# Patient Record
Sex: FEMALE | Race: WHITE | Employment: UNEMPLOYED | URBAN - METROPOLITAN AREA
[De-identification: names, ages, dates, MRNs, and addresses within clinical notes are randomized per-mention and may not be internally consistent; named-entity substitution may affect disease eponyms.]

---

## 2021-06-05 ENCOUNTER — HOSPITAL ENCOUNTER (EMERGENCY)
Age: 51
Discharge: HOME | End: 2021-06-05
Attending: FAMILY MEDICINE
Payer: COMMERCIAL

## 2021-06-05 ENCOUNTER — APPOINTMENT (OUTPATIENT)
Dept: GENERAL RADIOLOGY | Age: 51
End: 2021-06-05
Payer: COMMERCIAL

## 2021-06-05 VITALS
WEIGHT: 135 LBS | SYSTOLIC BLOOD PRESSURE: 111 MMHG | OXYGEN SATURATION: 97 % | HEIGHT: 65 IN | BODY MASS INDEX: 22.49 KG/M2 | RESPIRATION RATE: 18 BRPM | HEART RATE: 80 BPM | TEMPERATURE: 97.1 F | DIASTOLIC BLOOD PRESSURE: 80 MMHG

## 2021-06-05 DIAGNOSIS — S93.601A SPRAIN OF RIGHT FOOT, INITIAL ENCOUNTER: ICD-10-CM

## 2021-06-05 DIAGNOSIS — S63.502A SPRAIN OF LEFT WRIST, INITIAL ENCOUNTER: Primary | ICD-10-CM

## 2021-06-05 DIAGNOSIS — S80.02XA CONTUSION OF LEFT KNEE, INITIAL ENCOUNTER: ICD-10-CM

## 2021-06-05 PROCEDURE — 96372 THER/PROPH/DIAG INJ SC/IM: CPT

## 2021-06-05 PROCEDURE — 6360000002 HC RX W HCPCS: Performed by: FAMILY MEDICINE

## 2021-06-05 PROCEDURE — 73560 X-RAY EXAM OF KNEE 1 OR 2: CPT

## 2021-06-05 PROCEDURE — 73110 X-RAY EXAM OF WRIST: CPT

## 2021-06-05 PROCEDURE — 99283 EMERGENCY DEPT VISIT LOW MDM: CPT

## 2021-06-05 PROCEDURE — 73630 X-RAY EXAM OF FOOT: CPT

## 2021-06-05 RX ORDER — LEVOTHYROXINE SODIUM 0.15 MG/1
150 TABLET ORAL DAILY
COMMUNITY

## 2021-06-05 RX ORDER — KETOROLAC TROMETHAMINE 30 MG/ML
60 INJECTION, SOLUTION INTRAMUSCULAR; INTRAVENOUS ONCE
Status: COMPLETED | OUTPATIENT
Start: 2021-06-05 | End: 2021-06-05

## 2021-06-05 RX ORDER — OXYBUTYNIN CHLORIDE 5 MG/1
5 TABLET ORAL 2 TIMES DAILY
COMMUNITY

## 2021-06-05 RX ORDER — TRAZODONE HYDROCHLORIDE 50 MG/1
50 TABLET ORAL NIGHTLY PRN
COMMUNITY

## 2021-06-05 RX ORDER — NAPROXEN 375 MG/1
375 TABLET ORAL 2 TIMES DAILY WITH MEALS
COMMUNITY
End: 2021-11-04

## 2021-06-05 RX ORDER — IBUPROFEN 800 MG/1
800 TABLET ORAL EVERY 6 HOURS PRN
Qty: 20 TABLET | Refills: 0 | Status: SHIPPED | OUTPATIENT
Start: 2021-06-05

## 2021-06-05 RX ADMIN — KETOROLAC TROMETHAMINE 60 MG: 30 INJECTION, SOLUTION INTRAMUSCULAR at 11:09

## 2021-06-05 ASSESSMENT — PAIN DESCRIPTION - ORIENTATION: ORIENTATION: RIGHT;LEFT

## 2021-06-05 ASSESSMENT — PAIN SCALES - GENERAL
PAINLEVEL_OUTOF10: 9
PAINLEVEL_OUTOF10: 9

## 2021-06-05 ASSESSMENT — PAIN DESCRIPTION - ONSET: ONSET: SUDDEN

## 2021-06-05 ASSESSMENT — PAIN DESCRIPTION - DESCRIPTORS: DESCRIPTORS: BURNING

## 2021-06-05 ASSESSMENT — PAIN DESCRIPTION - PAIN TYPE: TYPE: ACUTE PAIN

## 2021-06-05 ASSESSMENT — PAIN DESCRIPTION - FREQUENCY: FREQUENCY: CONTINUOUS

## 2021-06-05 ASSESSMENT — PAIN DESCRIPTION - PROGRESSION: CLINICAL_PROGRESSION: NOT CHANGED

## 2021-06-05 NOTE — ED PROVIDER NOTES
---------------------------   The nursing notes within the ED encounter and vital signs as below have been reviewed. /80   Pulse 80   Temp 97.1 °F (36.2 °C) (Temporal)   Resp 18   Ht 5' 5\" (1.651 m)   Wt 135 lb (61.2 kg)   SpO2 97%   BMI 22.47 kg/m²   Oxygen Saturation Interpretation: Normal      ---------------------------------------------------PHYSICAL EXAM--------------------------------------    Constitutional/General: Alert and oriented x3, well appearing, non toxic in NAD  Head: NC/AT  Eyes: PERRL, EOMI  Mouth: Oropharynx clear, handling secretions, no trismus  Neck: Supple, full ROM, no meningeal signs  Pulmonary: Lungs clear to auscultation bilaterally, no wheezes, rales, or rhonchi. Not in respiratory distress  Cardiovascular:  Regular rate and rhythm, no murmurs, gallops, or rubs. 2+ distal pulses  Abdomen: Soft, non tender, non distended,   Extremities: Moves all extremities x 4. Warm and well perfused  There is left wrist tenderness to palpation on the radial aspect, with a mild swelling and ecchymosis noted. There is no deformity. The left knee is swollen over the patella and there is severe tenderness palpation over the patella. Is also erythematous and warm. Right foot:  There is tenderness palpation over the MTP joints at the fourth and fifth joints. No deformity or swelling was noted. No ecchymosis. Skin: warm and dry without rash  Neurologic: GCS 15,  Psych: Normal Affect      ------------------------------ ED COURSE/MEDICAL DECISION MAKING----------------------  Medications   ketorolac (TORADOL) injection 60 mg (60 mg Intramuscular Given 6/5/21 1109)         Medical Decision Making:    Simple    Counseling: The emergency provider has spoken with the patient and discussed todays results, in addition to providing specific details for the plan of care and counseling regarding the diagnosis and prognosis.   Questions are answered at this time and they are agreeable with the

## 2021-11-04 ENCOUNTER — HOSPITAL ENCOUNTER (EMERGENCY)
Age: 51
Discharge: HOME | End: 2021-11-04
Attending: EMERGENCY MEDICINE
Payer: COMMERCIAL

## 2021-11-04 VITALS
OXYGEN SATURATION: 99 % | DIASTOLIC BLOOD PRESSURE: 81 MMHG | HEART RATE: 72 BPM | BODY MASS INDEX: 26.46 KG/M2 | TEMPERATURE: 97.1 F | WEIGHT: 159 LBS | SYSTOLIC BLOOD PRESSURE: 121 MMHG | RESPIRATION RATE: 20 BRPM

## 2021-11-04 DIAGNOSIS — S91.302D OPEN WOUND OF LEFT HEEL, SUBSEQUENT ENCOUNTER: Primary | ICD-10-CM

## 2021-11-04 PROCEDURE — 99283 EMERGENCY DEPT VISIT LOW MDM: CPT

## 2021-11-04 PROCEDURE — 96372 THER/PROPH/DIAG INJ SC/IM: CPT

## 2021-11-04 PROCEDURE — 6360000002 HC RX W HCPCS: Performed by: EMERGENCY MEDICINE

## 2021-11-04 RX ORDER — DOXYCYCLINE HYCLATE 100 MG
100 TABLET ORAL 2 TIMES DAILY
COMMUNITY

## 2021-11-04 RX ORDER — KETOROLAC TROMETHAMINE 30 MG/ML
30 INJECTION, SOLUTION INTRAMUSCULAR; INTRAVENOUS ONCE
Status: COMPLETED | OUTPATIENT
Start: 2021-11-04 | End: 2021-11-04

## 2021-11-04 RX ADMIN — KETOROLAC TROMETHAMINE 30 MG: 30 INJECTION, SOLUTION INTRAMUSCULAR at 12:56

## 2021-11-04 ASSESSMENT — PAIN DESCRIPTION - DESCRIPTORS: DESCRIPTORS: BURNING

## 2021-11-04 ASSESSMENT — PAIN SCALES - GENERAL
PAINLEVEL_OUTOF10: 10
PAINLEVEL_OUTOF10: 10

## 2021-11-04 ASSESSMENT — PAIN DESCRIPTION - PROGRESSION: CLINICAL_PROGRESSION: NOT CHANGED

## 2021-11-04 ASSESSMENT — PAIN DESCRIPTION - PAIN TYPE: TYPE: ACUTE PAIN

## 2021-11-04 ASSESSMENT — PAIN DESCRIPTION - ONSET: ONSET: ON-GOING

## 2021-11-04 ASSESSMENT — PAIN - FUNCTIONAL ASSESSMENT: PAIN_FUNCTIONAL_ASSESSMENT: 0-10

## 2021-11-04 ASSESSMENT — PAIN DESCRIPTION - ORIENTATION: ORIENTATION: LEFT

## 2021-11-04 ASSESSMENT — PAIN DESCRIPTION - FREQUENCY: FREQUENCY: CONTINUOUS

## 2021-11-04 ASSESSMENT — PAIN DESCRIPTION - LOCATION: LOCATION: FOOT
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
## 2023-02-24 LAB — URINE CULTURE: ABNORMAL

## 2023-03-24 ENCOUNTER — TELEPHONE (OUTPATIENT)
Dept: PRIMARY CARE | Facility: CLINIC | Age: 53
End: 2023-03-24
Payer: COMMERCIAL

## 2023-03-27 ENCOUNTER — TELEMEDICINE (OUTPATIENT)
Dept: PRIMARY CARE | Facility: CLINIC | Age: 53
End: 2023-03-27
Payer: COMMERCIAL

## 2023-03-27 DIAGNOSIS — E03.9 ACQUIRED HYPOTHYROIDISM: ICD-10-CM

## 2023-03-27 DIAGNOSIS — M54.42 CHRONIC MIDLINE LOW BACK PAIN WITH BILATERAL SCIATICA: Primary | ICD-10-CM

## 2023-03-27 DIAGNOSIS — M54.41 CHRONIC MIDLINE LOW BACK PAIN WITH BILATERAL SCIATICA: Primary | ICD-10-CM

## 2023-03-27 DIAGNOSIS — G89.29 CHRONIC MIDLINE LOW BACK PAIN WITH BILATERAL SCIATICA: Primary | ICD-10-CM

## 2023-03-27 PROBLEM — M54.31 SCIATICA OF RIGHT SIDE: Status: ACTIVE | Noted: 2023-03-27

## 2023-03-27 PROBLEM — M96.1 LUMBAR POST-LAMINECTOMY SYNDROME: Status: ACTIVE | Noted: 2023-03-27

## 2023-03-27 PROBLEM — M54.2 CERVICAL PAIN (NECK): Status: ACTIVE | Noted: 2023-03-27

## 2023-03-27 PROBLEM — R53.83 FATIGUE: Status: ACTIVE | Noted: 2023-03-27

## 2023-03-27 PROBLEM — R53.1 GENERALIZED WEAKNESS: Status: ACTIVE | Noted: 2023-03-27

## 2023-03-27 PROBLEM — M54.17 LUMBOSACRAL NEURITIS: Status: ACTIVE | Noted: 2023-03-27

## 2023-03-27 PROBLEM — M47.817 SPONDYLOSIS OF LUMBOSACRAL SPINE WITHOUT MYELOPATHY: Status: ACTIVE | Noted: 2023-03-27

## 2023-03-27 PROBLEM — M96.1 FAILED BACK SURGICAL SYNDROME: Status: ACTIVE | Noted: 2023-03-27

## 2023-03-27 PROBLEM — M25.562 CHRONIC PAIN OF LEFT KNEE: Status: ACTIVE | Noted: 2023-03-27

## 2023-03-27 PROBLEM — F32.A DEPRESSION: Status: ACTIVE | Noted: 2023-03-27

## 2023-03-27 PROBLEM — M25.551 PAIN OF BOTH HIP JOINTS: Status: ACTIVE | Noted: 2023-03-27

## 2023-03-27 PROBLEM — R52 GENERALIZED BODY ACHES: Status: ACTIVE | Noted: 2023-03-27

## 2023-03-27 PROBLEM — R63.5 ABNORMAL WEIGHT GAIN: Status: ACTIVE | Noted: 2023-03-27

## 2023-03-27 PROBLEM — K59.00 CONSTIPATION: Status: ACTIVE | Noted: 2023-03-27

## 2023-03-27 PROBLEM — G90.09 OTHER IDIOPATHIC PERIPHERAL AUTONOMIC NEUROPATHY: Status: ACTIVE | Noted: 2023-03-27

## 2023-03-27 PROBLEM — F51.04 CHRONIC INSOMNIA: Status: ACTIVE | Noted: 2023-03-27

## 2023-03-27 PROBLEM — F41.9 ANXIETY: Status: ACTIVE | Noted: 2023-03-27

## 2023-03-27 PROBLEM — M54.6 PAIN IN THORACIC SPINE: Status: ACTIVE | Noted: 2023-03-27

## 2023-03-27 PROBLEM — M25.552 PAIN OF BOTH HIP JOINTS: Status: ACTIVE | Noted: 2023-03-27

## 2023-03-27 PROBLEM — E78.2 COMBINED HYPERLIPIDEMIA: Status: ACTIVE | Noted: 2023-03-27

## 2023-03-27 PROBLEM — R39.15 URINARY URGENCY: Status: ACTIVE | Noted: 2023-03-27

## 2023-03-27 PROBLEM — M54.50 CHRONIC LOW BACK PAIN: Status: ACTIVE | Noted: 2023-03-27

## 2023-03-27 PROBLEM — R31.9 HEMATURIA: Status: ACTIVE | Noted: 2023-03-27

## 2023-03-27 PROBLEM — M79.2 RADICULAR PAIN IN RIGHT ARM: Status: ACTIVE | Noted: 2023-03-27

## 2023-03-27 PROBLEM — G43.009 MIGRAINE WITHOUT AURA AND WITHOUT STATUS MIGRAINOSUS, NOT INTRACTABLE: Status: ACTIVE | Noted: 2023-03-27

## 2023-03-27 PROBLEM — E66.9 OBESITY: Status: ACTIVE | Noted: 2023-03-27

## 2023-03-27 PROCEDURE — 99213 OFFICE O/P EST LOW 20 MIN: CPT | Performed by: FAMILY MEDICINE

## 2023-03-27 RX ORDER — ATORVASTATIN CALCIUM 40 MG/1
1 TABLET, FILM COATED ORAL NIGHTLY
COMMUNITY
Start: 2016-08-24 | End: 2023-09-27 | Stop reason: SDUPTHER

## 2023-03-27 RX ORDER — ALBUTEROL SULFATE 90 UG/1
AEROSOL, METERED RESPIRATORY (INHALATION)
COMMUNITY
Start: 2022-10-17 | End: 2023-09-15 | Stop reason: ALTCHOICE

## 2023-03-27 RX ORDER — LEVOTHYROXINE SODIUM 112 UG/1
112 TABLET ORAL
COMMUNITY
End: 2023-03-27 | Stop reason: SDUPTHER

## 2023-03-27 RX ORDER — CYCLOBENZAPRINE HCL 10 MG
1 TABLET ORAL 3 TIMES DAILY
COMMUNITY
Start: 2021-08-13 | End: 2023-09-27 | Stop reason: SDUPTHER

## 2023-03-27 RX ORDER — LEVOTHYROXINE SODIUM 112 UG/1
112 TABLET ORAL
Qty: 90 TABLET | Refills: 0 | Status: SHIPPED | OUTPATIENT
Start: 2023-03-27 | End: 2023-07-17 | Stop reason: SDUPTHER

## 2023-03-27 RX ORDER — KETOROLAC TROMETHAMINE 10 MG/1
TABLET, FILM COATED ORAL
COMMUNITY
Start: 2023-02-15 | End: 2023-03-27 | Stop reason: SDUPTHER

## 2023-03-27 RX ORDER — KETOROLAC TROMETHAMINE 10 MG/1
10 TABLET, FILM COATED ORAL 3 TIMES DAILY
Qty: 20 TABLET | Refills: 0 | Status: SHIPPED | OUTPATIENT
Start: 2023-03-27 | End: 2023-04-26 | Stop reason: SDUPTHER

## 2023-03-27 NOTE — PROGRESS NOTES
Subjective   Patient ID: Catherine Cano is a 52 y.o. female who presents for No chief complaint on file..    HPI   Chronic low back dull pain was worse and radiated down to BLE. pt controlled BM and bladder well. no imbalance or falls.     Review of Systems    Objective   There were no vitals taken for this visit.    Physical Exam    Assessment/Plan     Worse Chronic low back pain with pain radiating down to BLE, toradol 10mg tid prn as dir. caution of SE from toradol such as kidney damage and stomach bleeding. take toradol  with foods. Fu with ortho

## 2023-04-26 ENCOUNTER — OFFICE VISIT (OUTPATIENT)
Dept: PRIMARY CARE | Facility: CLINIC | Age: 53
End: 2023-04-26
Payer: COMMERCIAL

## 2023-04-26 ENCOUNTER — TELEPHONE (OUTPATIENT)
Dept: PRIMARY CARE | Facility: CLINIC | Age: 53
End: 2023-04-26

## 2023-04-26 VITALS
DIASTOLIC BLOOD PRESSURE: 75 MMHG | WEIGHT: 173 LBS | BODY MASS INDEX: 28.82 KG/M2 | SYSTOLIC BLOOD PRESSURE: 134 MMHG | HEIGHT: 65 IN

## 2023-04-26 DIAGNOSIS — G89.29 CHRONIC BILATERAL LOW BACK PAIN WITHOUT SCIATICA: Primary | ICD-10-CM

## 2023-04-26 DIAGNOSIS — M54.50 CHRONIC BILATERAL LOW BACK PAIN WITHOUT SCIATICA: Primary | ICD-10-CM

## 2023-04-26 DIAGNOSIS — F17.210 SMOKES 1 TO 10 CIGARETTES PER DAY: ICD-10-CM

## 2023-04-26 PROCEDURE — 96372 THER/PROPH/DIAG INJ SC/IM: CPT | Performed by: FAMILY MEDICINE

## 2023-04-26 PROCEDURE — 20553 NJX 1/MLT TRIGGER POINTS 3/>: CPT | Performed by: FAMILY MEDICINE

## 2023-04-26 PROCEDURE — 99213 OFFICE O/P EST LOW 20 MIN: CPT | Performed by: FAMILY MEDICINE

## 2023-04-26 PROCEDURE — 99406 BEHAV CHNG SMOKING 3-10 MIN: CPT | Performed by: FAMILY MEDICINE

## 2023-04-26 RX ORDER — KETOROLAC TROMETHAMINE 30 MG/ML
30 INJECTION, SOLUTION INTRAMUSCULAR; INTRAVENOUS ONCE
Status: COMPLETED | OUTPATIENT
Start: 2023-04-26 | End: 2023-04-26

## 2023-04-26 RX ORDER — KETOROLAC TROMETHAMINE 30 MG/ML
30 INJECTION, SOLUTION INTRAMUSCULAR; INTRAVENOUS ONCE
Status: DISCONTINUED | OUTPATIENT
Start: 2023-04-26 | End: 2023-04-26

## 2023-04-26 RX ORDER — KETOROLAC TROMETHAMINE 10 MG/1
10 TABLET, FILM COATED ORAL 3 TIMES DAILY
Qty: 20 TABLET | Refills: 0 | Status: SHIPPED | OUTPATIENT
Start: 2023-04-26 | End: 2023-05-09 | Stop reason: SDUPTHER

## 2023-04-26 RX ADMIN — KETOROLAC TROMETHAMINE 30 MG: 30 INJECTION, SOLUTION INTRAMUSCULAR; INTRAVENOUS at 16:03

## 2023-04-26 ASSESSMENT — PATIENT HEALTH QUESTIONNAIRE - PHQ9
1. LITTLE INTEREST OR PLEASURE IN DOING THINGS: NOT AT ALL
SUM OF ALL RESPONSES TO PHQ9 QUESTIONS 1 AND 2: 0
2. FEELING DOWN, DEPRESSED OR HOPELESS: NOT AT ALL

## 2023-04-26 ASSESSMENT — ENCOUNTER SYMPTOMS: BACK PAIN: 1

## 2023-04-26 NOTE — PROGRESS NOTES
"Subjective   Patient ID: Catherine Cano is a 52 y.o. female who presents for Back Pain.    Back Pain       Patient noticed worse localized muscle spasm at low back got in the past week. turning trunk from side to side made the pain worse. Pt fell backwards last week. Stable BM and bladder well. no genital area or low ext  numbness. Good balance    Review of Systems   Musculoskeletal:  Positive for back pain.       Objective   Ht 1.651 m (5' 5\")   Wt 78.5 kg (173 lb)   BMI 28.79 kg/m²     Physical Exam  Musculoskeletal:        Arms:        Mild  distress, well groomed, eyes: Normal pupil size,  neck is supple, lungs: CTA b/l, no rales, heart: RRR,  abd: soft, no tenderness, BS+, moderate localized  tenderness at paraspinal muscles as marked in the picture, no local swelling or erythema. turning trunk from side to side aggravated the pain. normal strength and sensation at ext. normal gait and station. Good mood.     Assessment/Plan     Myofascial pain at  low  back, worse. Pt requested toradol shot and trigger point injection of the areas of pain. Under sterile condition, the areas of pain as marked in the picture was each  injected with 1.5cc  1% lidocaine (NDC: 0082-2731-65) in a pepper spray fashion.  total volume of lidocaine used was 9 ml. pt felt pain relief after the injection. Recommend warm compress. Advise stretch exercise for pain relief. Toradol tab po prn as dir. Caution of SE of stomach bleeding and kidney damage   Nicotine dependence counseling: patient  smokes cigarettes.  I discussed with patient that  tobacco addiction increases the risks of worse pain, COPD (emphysema), heart attack, stroke, Peripheral artery disease, and cancer etc. Treatment options such as behavioral counseling (specialty clinic, smoking cessation program, 1-800-QUIT-NOW) and medications (Zyban, chantix and Nicotine replacement therapy) were  discussed with the patient who is considering to quit. Nicotine withdrawal symptoms " such as  increased appetite and weight gain, changes in mood (dysphoria or depression), insomnia, irritability, anxiety, difficulty concentrating and restlessness were discussed with patient. Inform patient that Nicotine withdrawal symptoms peak in the first three days of quitting and subside over three to four weeks. More than 3 minutes' discussion and counseling.

## 2023-05-09 ENCOUNTER — TELEPHONE (OUTPATIENT)
Dept: PRIMARY CARE | Facility: CLINIC | Age: 53
End: 2023-05-09
Payer: COMMERCIAL

## 2023-05-09 DIAGNOSIS — G89.29 CHRONIC BILATERAL LOW BACK PAIN WITHOUT SCIATICA: ICD-10-CM

## 2023-05-09 DIAGNOSIS — M54.50 CHRONIC BILATERAL LOW BACK PAIN WITHOUT SCIATICA: ICD-10-CM

## 2023-05-09 RX ORDER — KETOROLAC TROMETHAMINE 10 MG/1
10 TABLET, FILM COATED ORAL 3 TIMES DAILY
Qty: 15 TABLET | Refills: 0 | Status: SHIPPED
Start: 2023-05-09 | End: 2023-05-26 | Stop reason: SDUPTHER

## 2023-05-09 NOTE — TELEPHONE ENCOUNTER
Pt called in after hours - is requesting a prescription for topamax to help with weight loss and she is wanting some sort of pain medication to take with her on vacation that they leave for on Friday.

## 2023-05-26 DIAGNOSIS — M54.50 CHRONIC BILATERAL LOW BACK PAIN WITHOUT SCIATICA: ICD-10-CM

## 2023-05-26 DIAGNOSIS — G89.29 CHRONIC BILATERAL LOW BACK PAIN WITHOUT SCIATICA: ICD-10-CM

## 2023-05-26 RX ORDER — KETOROLAC TROMETHAMINE 10 MG/1
10 TABLET, FILM COATED ORAL 3 TIMES DAILY
Qty: 15 TABLET | Refills: 0 | Status: SHIPPED | OUTPATIENT
Start: 2023-05-26 | End: 2023-06-14 | Stop reason: SDUPTHER

## 2023-06-14 ENCOUNTER — OFFICE VISIT (OUTPATIENT)
Dept: PRIMARY CARE | Facility: CLINIC | Age: 53
End: 2023-06-14
Payer: COMMERCIAL

## 2023-06-14 VITALS — HEART RATE: 82 BPM | DIASTOLIC BLOOD PRESSURE: 79 MMHG | SYSTOLIC BLOOD PRESSURE: 136 MMHG

## 2023-06-14 DIAGNOSIS — M54.50 CHRONIC BILATERAL LOW BACK PAIN WITHOUT SCIATICA: ICD-10-CM

## 2023-06-14 DIAGNOSIS — M25.551 PAIN OF BOTH HIP JOINTS: Primary | ICD-10-CM

## 2023-06-14 DIAGNOSIS — F17.210 SMOKES 1 TO 10 CIGARETTES PER DAY: ICD-10-CM

## 2023-06-14 DIAGNOSIS — M25.552 PAIN OF BOTH HIP JOINTS: Primary | ICD-10-CM

## 2023-06-14 DIAGNOSIS — M54.31 SCIATICA OF RIGHT SIDE: ICD-10-CM

## 2023-06-14 DIAGNOSIS — G89.29 CHRONIC BILATERAL LOW BACK PAIN WITHOUT SCIATICA: ICD-10-CM

## 2023-06-14 PROCEDURE — 99406 BEHAV CHNG SMOKING 3-10 MIN: CPT | Performed by: FAMILY MEDICINE

## 2023-06-14 PROCEDURE — 96372 THER/PROPH/DIAG INJ SC/IM: CPT | Performed by: FAMILY MEDICINE

## 2023-06-14 PROCEDURE — 99213 OFFICE O/P EST LOW 20 MIN: CPT | Performed by: FAMILY MEDICINE

## 2023-06-14 RX ORDER — KETOROLAC TROMETHAMINE 10 MG/1
10 TABLET, FILM COATED ORAL 3 TIMES DAILY
Qty: 15 TABLET | Refills: 0 | Status: SHIPPED
Start: 2023-06-14 | End: 2023-08-02 | Stop reason: SDUPTHER

## 2023-06-14 RX ORDER — GABAPENTIN 300 MG/1
300 CAPSULE ORAL NIGHTLY
Qty: 30 CAPSULE | Refills: 0 | Status: SHIPPED
Start: 2023-06-14 | End: 2023-09-15 | Stop reason: ALTCHOICE

## 2023-06-14 RX ORDER — KETOROLAC TROMETHAMINE 30 MG/ML
30 INJECTION, SOLUTION INTRAMUSCULAR; INTRAVENOUS ONCE
Status: COMPLETED | OUTPATIENT
Start: 2023-06-14 | End: 2023-06-14

## 2023-06-14 RX ADMIN — KETOROLAC TROMETHAMINE 30 MG: 30 INJECTION, SOLUTION INTRAMUSCULAR; INTRAVENOUS at 15:06

## 2023-06-14 NOTE — PROGRESS NOTES
Subjective   Patient ID: Catherine Cano is a 52 y.o. female who presents for worse low back and b/l hip pain    HPI   Chronic low back and b/l hip dull pain was worse at 7-9/10. the back pain radiated down to RLE. pt denies LE weakness or numbness. pt controlled BM and bladder well. pt stated that walking or bending  over increased the pain. no imbalance or falls.     Review of Systems    Objective   /79   Pulse 82     Physical Exam  Mild  distress, well groomed, No sclera icterus. normal respiration, lungs: CTA b/l, heart: RRR, no LE  edema, normal pedal pulses, abd: soft, no tenderness, BS+, mild low back and b/l hip tenderness, DTR were normal at the knees, SLR test was + at rt side, normal strength and sensation at low extremities, good balance, No depressed mood.    Assessment/Plan     Chronic low back and b/l hip pain which got worse with pain radiating down to RLE, toradol 30mg IM and start neurontin  as dir. Pain specialist eval. Check xr. call office if symptoms do not improve in 2 weeks or if pain gets worse.  Nicotine dependence counseling: patient  smokes cigarettes.  I discussed with patient that  tobacco addiction increases the risks of COPD (emphysema), heart attack, stroke, Peripheral artery disease, and cancer etc. Treatment options such as behavioral counseling (specialty clinic, smoking cessation program, 1-800-QUIT-NOW) and medications (Zyban, chantix and Nicotine replacement therapy) were  discussed with the patient who is considering to quit. Nicotine withdrawal symptoms such as  increased appetite and weight gain, changes in mood (dysphoria or depression), insomnia, irritability, anxiety, difficulty concentrating and restlessness were discussed with patient. Inform patient that Nicotine withdrawal symptoms peak in the first three days of quitting and subside over three to four weeks. More than 3 minutes' discussion and counseling.

## 2023-06-20 ENCOUNTER — TELEPHONE (OUTPATIENT)
Dept: PRIMARY CARE | Facility: CLINIC | Age: 53
End: 2023-06-20
Payer: COMMERCIAL

## 2023-06-20 NOTE — TELEPHONE ENCOUNTER
Patient was seen in [facility] where the did x-rays and CT scans for her. They found a compression fracture in her spine and she is having surgery Thursday morning. She just wanted to give you an update. Thank you

## 2023-07-05 DIAGNOSIS — M25.552 PAIN OF BOTH HIP JOINTS: ICD-10-CM

## 2023-07-05 DIAGNOSIS — M54.31 SCIATICA OF RIGHT SIDE: ICD-10-CM

## 2023-07-05 DIAGNOSIS — M25.551 PAIN OF BOTH HIP JOINTS: ICD-10-CM

## 2023-07-05 RX ORDER — GABAPENTIN 300 MG/1
CAPSULE ORAL
Qty: 30 CAPSULE | Refills: 0 | OUTPATIENT
Start: 2023-07-05

## 2023-07-14 ENCOUNTER — TELEPHONE (OUTPATIENT)
Dept: PRIMARY CARE | Facility: CLINIC | Age: 53
End: 2023-07-14
Payer: COMMERCIAL

## 2023-07-14 NOTE — TELEPHONE ENCOUNTER
Patient is getting ready to go to the [facility] and would like a refill on her Toradol sent to [facility] in [place]. She would also like to go back down to her previous dose on the levothyroxine as she is having side affects from it. Her number is [phone]

## 2023-07-17 ENCOUNTER — TELEPHONE (OUTPATIENT)
Dept: PRIMARY CARE | Facility: CLINIC | Age: 53
End: 2023-07-17

## 2023-07-17 ENCOUNTER — TELEMEDICINE (OUTPATIENT)
Dept: PRIMARY CARE | Facility: CLINIC | Age: 53
End: 2023-07-17
Payer: COMMERCIAL

## 2023-07-17 DIAGNOSIS — M54.50 CHRONIC BILATERAL LOW BACK PAIN WITHOUT SCIATICA: Primary | ICD-10-CM

## 2023-07-17 DIAGNOSIS — E03.9 ACQUIRED HYPOTHYROIDISM: ICD-10-CM

## 2023-07-17 DIAGNOSIS — G89.29 CHRONIC BILATERAL LOW BACK PAIN WITHOUT SCIATICA: Primary | ICD-10-CM

## 2023-07-17 PROCEDURE — 99213 OFFICE O/P EST LOW 20 MIN: CPT | Performed by: FAMILY MEDICINE

## 2023-07-17 RX ORDER — KETOROLAC TROMETHAMINE 10 MG/1
10 TABLET, FILM COATED ORAL EVERY 6 HOURS PRN
Qty: 15 TABLET | Refills: 0 | Status: SHIPPED | OUTPATIENT
Start: 2023-07-17 | End: 2023-07-22

## 2023-07-17 RX ORDER — LEVOTHYROXINE SODIUM 112 UG/1
112 TABLET ORAL
Qty: 90 TABLET | Refills: 0 | Status: SHIPPED | OUTPATIENT
Start: 2023-07-17 | End: 2023-09-27 | Stop reason: SDUPTHER

## 2023-07-17 NOTE — PROGRESS NOTES
Subjective   Patient ID: Catherine Cano is a 52 y.o. female who presents for worse low back pain  HPI   Low back pain was worse at 9/10 a few days ago. the pain was localized to low back. pt denies LE weakness or numbness. pt controlled BM and bladder well. pt stated that walking or bending  over increased the pain. Review of Systems    Objective   There were no vitals taken for this visit.    Physical Exam    Assessment/Plan     Chronic low back pain which got worse. cold compress, stretch exercise.  start toradol tab as dir. caution of SE from toradol such as kidney damage and stomach bleeding. take toradol tab  with foods. call office if symptoms do not improve in 2 weeks or if pain gets worse.

## 2023-07-17 NOTE — TELEPHONE ENCOUNTER
Patient called back regarding her levothyroxine and her bottle that she filled on July 5th shows 125 instead of the 112 that you called in on 3/27. Can we adjust this back to the 112 for her? Thank you

## 2023-08-02 ENCOUNTER — TELEMEDICINE (OUTPATIENT)
Dept: PRIMARY CARE | Facility: CLINIC | Age: 53
End: 2023-08-02
Payer: COMMERCIAL

## 2023-08-02 ENCOUNTER — TELEPHONE (OUTPATIENT)
Dept: PRIMARY CARE | Facility: CLINIC | Age: 53
End: 2023-08-02

## 2023-08-02 DIAGNOSIS — G89.29 CHRONIC MIDLINE THORACIC BACK PAIN: Primary | ICD-10-CM

## 2023-08-02 DIAGNOSIS — M54.6 CHRONIC MIDLINE THORACIC BACK PAIN: Primary | ICD-10-CM

## 2023-08-02 DIAGNOSIS — M54.50 CHRONIC BILATERAL LOW BACK PAIN WITHOUT SCIATICA: ICD-10-CM

## 2023-08-02 DIAGNOSIS — G89.29 CHRONIC BILATERAL LOW BACK PAIN WITHOUT SCIATICA: ICD-10-CM

## 2023-08-02 PROCEDURE — 99213 OFFICE O/P EST LOW 20 MIN: CPT | Performed by: FAMILY MEDICINE

## 2023-08-02 RX ORDER — KETOROLAC TROMETHAMINE 10 MG/1
10 TABLET, FILM COATED ORAL 3 TIMES DAILY
Qty: 15 TABLET | Refills: 0 | Status: SHIPPED | OUTPATIENT
Start: 2023-08-02 | End: 2023-08-14 | Stop reason: SDUPTHER

## 2023-08-02 NOTE — TELEPHONE ENCOUNTER
Patient is in the [facility] until tomorrow and would like to know if we can call in some torodol to [facility] there at [phone]. She is in so much pain she is struggling to move. She also feels like she is getting a bladder infection and has been taking Azo. Can we call her in the Torodol.

## 2023-08-02 NOTE — PROGRESS NOTES
Subjective   Patient ID: Catherine Cano is a 52 y.o. female who presents for mid  back pain  HPI   Chronic mid  back dull pain was worse at 9/10. the pain  radiated down to RLE. pt denies LE weakness or numbness. pt controlled BM and bladder well. pt stated that walking or bending  over increased the pain. no falls.     Review of Systems    Objective   There were no vitals taken for this visit.    Physical Exam    Assessment/Plan     Worse back pain.  start toradol tab  as dir. caution of SE from toradol tab such as kidney damage and stomach bleeding. take toradol with foods. call office if symptoms do not improve in 2 weeks or if pain gets worse.

## 2023-08-14 ENCOUNTER — OFFICE VISIT (OUTPATIENT)
Dept: PRIMARY CARE | Facility: CLINIC | Age: 53
End: 2023-08-14
Payer: COMMERCIAL

## 2023-08-14 ENCOUNTER — TELEPHONE (OUTPATIENT)
Dept: PRIMARY CARE | Facility: CLINIC | Age: 53
End: 2023-08-14

## 2023-08-14 VITALS — DIASTOLIC BLOOD PRESSURE: 79 MMHG | SYSTOLIC BLOOD PRESSURE: 129 MMHG

## 2023-08-14 DIAGNOSIS — M54.50 CHRONIC BILATERAL LOW BACK PAIN WITHOUT SCIATICA: Primary | ICD-10-CM

## 2023-08-14 DIAGNOSIS — F17.210 SMOKES 1 TO 10 CIGARETTES PER DAY: ICD-10-CM

## 2023-08-14 DIAGNOSIS — B00.1 HERPES LABIALIS: ICD-10-CM

## 2023-08-14 DIAGNOSIS — G89.29 CHRONIC BILATERAL LOW BACK PAIN WITHOUT SCIATICA: Primary | ICD-10-CM

## 2023-08-14 PROCEDURE — 96372 THER/PROPH/DIAG INJ SC/IM: CPT | Performed by: FAMILY MEDICINE

## 2023-08-14 PROCEDURE — 99406 BEHAV CHNG SMOKING 3-10 MIN: CPT | Performed by: FAMILY MEDICINE

## 2023-08-14 PROCEDURE — 99214 OFFICE O/P EST MOD 30 MIN: CPT | Performed by: FAMILY MEDICINE

## 2023-08-14 RX ORDER — KETOROLAC TROMETHAMINE 30 MG/ML
30 INJECTION, SOLUTION INTRAMUSCULAR; INTRAVENOUS ONCE
Status: COMPLETED | OUTPATIENT
Start: 2023-08-14 | End: 2023-08-14

## 2023-08-14 RX ORDER — NORTRIPTYLINE HYDROCHLORIDE 75 MG/1
CAPSULE ORAL
COMMUNITY
Start: 2021-08-31 | End: 2023-08-14 | Stop reason: SDUPTHER

## 2023-08-14 RX ORDER — KETOROLAC TROMETHAMINE 10 MG/1
10 TABLET, FILM COATED ORAL 3 TIMES DAILY
Qty: 15 TABLET | Refills: 0 | Status: SHIPPED | OUTPATIENT
Start: 2023-08-14 | End: 2023-09-01 | Stop reason: SDUPTHER

## 2023-08-14 RX ORDER — NORTRIPTYLINE HYDROCHLORIDE 75 MG/1
CAPSULE ORAL
Qty: 90 CAPSULE | Refills: 0 | Status: SHIPPED
Start: 2023-08-14 | End: 2023-11-01 | Stop reason: ALTCHOICE

## 2023-08-14 RX ORDER — VALACYCLOVIR HYDROCHLORIDE 1 G/1
2000 TABLET, FILM COATED ORAL 2 TIMES DAILY
Qty: 4 TABLET | Refills: 0 | Status: SHIPPED | OUTPATIENT
Start: 2023-08-14 | End: 2023-08-15

## 2023-08-14 RX ADMIN — KETOROLAC TROMETHAMINE 30 MG: 30 INJECTION, SOLUTION INTRAMUSCULAR; INTRAVENOUS at 15:34

## 2023-08-14 NOTE — ADDENDUM NOTE
Addended by: DEACON MILLER on: 8/14/2023 04:04 PM     Modules accepted: Orders, Level of Service

## 2023-08-14 NOTE — PROGRESS NOTES
Subjective   Patient ID: Catherine Cano is a 52 y.o. female who presents for worse low back pain    HPI   Chronic low back dull pain was worse at 7/10. the pain was localized to low back. r pt denies LE weakness or numbness. pt controlled BM and bladder well. pt stated that walking or bending  over increased the pain. no imbalance or falls.   Cold sores at lips for a few days. No fever or chills.   Review of Systems    Objective   /79     Physical Exam  No distress, well groomed, No sclera icterus. Cold sores on upper and lower lips. No cervical or axillary lymphadenopathy.  normal respiration, lungs: CTA b/l, heart: RRR, no LE  edema, normal pedal pulses, abd: soft, no tenderness, BS+, mild low back tenderness, DTR were normal at the knees, SLR test was negative, normal strength and sensation at low extremities, good balance, No depressed mood.    Assessment/Plan     Chronic low back pain which got worse lately. Toradol 30mg IM and toradol tab as dir and fu with pain specialist.  Cont nortriptyline and Neurontin.   Nicotine dependence counseling: patient  smokes cigarettes.  I discussed with patient that  tobacco addiction increases the risks of COPD (emphysema), heart attack, stroke, Peripheral artery disease, and cancer etc. Treatment options such as behavioral counseling (specialty clinic, smoking cessation program, 1-800-QUIT-NOW) and medications (Zyban, chantix and Nicotine replacement therapy) were  discussed with the patient who is considering to quit. Nicotine withdrawal symptoms such as  increased appetite and weight gain, changes in mood (dysphoria or depression), insomnia, irritability, anxiety, difficulty concentrating and restlessness were discussed with patient. Inform patient that Nicotine withdrawal symptoms peak in the first three days of quitting and subside over three to four weeks. More than 3 minutes' discussion and counseling.

## 2023-08-14 NOTE — TELEPHONE ENCOUNTER
Catherine was wanting to know if while you were calling in her medications if you could also call in something for cold sores as she has been getting them her whole vacation and woke with another one this morning.

## 2023-09-01 ENCOUNTER — TELEPHONE (OUTPATIENT)
Dept: PRIMARY CARE | Facility: CLINIC | Age: 53
End: 2023-09-01
Payer: COMMERCIAL

## 2023-09-01 ENCOUNTER — TELEPHONE (OUTPATIENT)
Dept: PRIMARY CARE | Facility: CLINIC | Age: 53
End: 2023-09-01

## 2023-09-01 NOTE — TELEPHONE ENCOUNTER
She called and wanted me to let you know that she is on the schedule 9/15 and was just here on 8/14/23

## 2023-09-15 ENCOUNTER — OFFICE VISIT (OUTPATIENT)
Dept: PRIMARY CARE | Facility: CLINIC | Age: 53
End: 2023-09-15
Payer: COMMERCIAL

## 2023-09-15 VITALS
SYSTOLIC BLOOD PRESSURE: 126 MMHG | DIASTOLIC BLOOD PRESSURE: 76 MMHG | RESPIRATION RATE: 14 BRPM | HEIGHT: 65 IN | WEIGHT: 175 LBS | HEART RATE: 76 BPM | BODY MASS INDEX: 29.16 KG/M2

## 2023-09-15 DIAGNOSIS — Z12.31 BREAST CANCER SCREENING BY MAMMOGRAM: ICD-10-CM

## 2023-09-15 DIAGNOSIS — Z00.00 ROUTINE MEDICAL EXAM: Primary | ICD-10-CM

## 2023-09-15 PROBLEM — E66.9 OBESITY: Status: RESOLVED | Noted: 2023-03-27 | Resolved: 2023-09-15

## 2023-09-15 PROBLEM — K59.00 CONSTIPATION: Status: RESOLVED | Noted: 2023-03-27 | Resolved: 2023-09-15

## 2023-09-15 PROBLEM — R31.9 HEMATURIA: Status: RESOLVED | Noted: 2023-03-27 | Resolved: 2023-09-15

## 2023-09-15 PROCEDURE — 99396 PREV VISIT EST AGE 40-64: CPT | Performed by: FAMILY MEDICINE

## 2023-09-15 RX ORDER — VENLAFAXINE HYDROCHLORIDE 150 MG/1
CAPSULE, EXTENDED RELEASE ORAL
COMMUNITY
Start: 2020-10-28 | End: 2023-09-27 | Stop reason: SDUPTHER

## 2023-09-15 NOTE — PROGRESS NOTES
pt has regular dental visits. no vision problems. no hearing loss.   Lifestyle: healthy diet. + weight concerns. Pt exercises occ.   + tobacco but no  alcohol abuse.   Safety elements used: seat belt, safe driving habits and smoke detector.   no passive smoke exposure, chemical abuse, domestic violence, anxiety symptoms, depression symptoms, pt has safe sexual behavior and safe driving habits, no driving violations, no history of DUI. no tuberculosis exposure.   The patient is postmenopausal. No vaginal bleeding. no menopausal symptoms. she is sexually active. no bladder concerns.  no history of an abnormal pap smear.      Review of Systems  Constitutional: no chills, no fever and no night sweats.   Eyes: no blurred vision and no eyesight problems.   ENT: no hearing loss, no nasal congestion, no nasal discharge, no hoarseness and no sore throat.   Neck: no mass(es) and no swelling.   Cardiovascular: no chest pain, no intermittent leg claudication, no lower extremity edema, no palpitations and no syncope.   Respiratory: no cough, no shortness of breath during exertion, no shortness of breath at rest and no wheezing.   Gastrointestinal: no abdominal pain, no blood in stools, no constipation, no diarrhea, no melena, no nausea, no rectal pain and no vomiting.   Genitourinary: no unexplained vaginal bleeding, no dysuria, no change in urinary frequency, no genital lesions, no hematuria, no urinary hesitancy, no incontinence, no pelvic pain, no sexual dysfunction, no feelings of urinary urgency and no vaginal discharge.   Musculoskeletal: + arthralgias, + back pain, + localized joint pain, no myalgias,  + neck pain.   Integumentary: no new skin lesions, no rashes and no skin wound.   Neurological: no confusion, no convulsions, no difficulty walking, no dizziness, no headache, no limb weakness, no memory changes, no numbness, no speech difficulties and no tingling.   Psychiatric: no anxiety, no personality change, no  depression, no anhedonia, no sleep disturbances and no substance use disorders.   Endocrine: no changes in appetite, no deepening of the voice, no polyuria, no feelings of weakness  Hematologic/Lymphatic: no tendency for easy bleeding, no tendency for easy bruising, no recurrent infections and no swollen glands.     Physical Exam  Constitutional: Alert and in no acute distress. Well developed, well nourished.   Head and Face: Head and face: Normal.  Palpation of the face and sinuses: Normal.    Eyes: Normal external exam. Pupils: PERRLA,  EOMI.    Ears, Nose, Mouth, and Throat: External inspection of ears and nose: Normal.  Hearing: Normal.  Nasal mucosa, septum, and turbinates: Normal.  Oropharynx: Normal.    Neck: No neck mass was observed. Supple. Thyroid not enlarged and there were no palpable thyroid nodules.   Cardiovascular: Heart rate and rhythm were normal, normal S1 and S2, no gallops, no murmurs and no pericardial rub. Pedal pulses: Normal. No peripheral edema.   Pulmonary: No respiratory distress. Clear bilateral breath sounds.   Chest wall: Normal.    Abdomen: Soft nontender; no abdominal mass palpated. No organomegaly. No hernias.   Musculoskeletal: Gait and station: Normal. No joint swelling seen, normal movements of all extremities. Range of motion: Normal.  Muscle strength/tone: Normal.  tenderness at neck, left shoulder, low back, b/l hips and knees  Skin: Normal skin color and pigmentation, normal skin turgor, and no rash. Palpation of skin and subcutaneous tissue: Normal.    Neurologic: Cranial nerves 2-12 grossly intact. Deep tendon reflexes were 2+ and symmetric. Sensation: Normal. Coordination: Normal.    Psychiatric: Judgment and insight: Intact. Alert and oriented x 3. Recent and remote memory: Normal.  Mood and affect: Normal.   Lymphatic: No cervical lymphadenopathy. Palpation of lymph nodes in axillae: Normal.      Unremarkable PE except overweight. recommend nutritionist eval. Recommend  DASH diet and regular exercise. check CBC, CMP, lipid, TSH.  Advise eye exam by an OD yearly and dental exam every 6 months. will monitor lipid and weight yearly. Recommend sunscreen application if exposed to the sun when UV index is above 2.  recommend Hep C, HepB, HIV test. recommend   shingle,  hepB, flu, covid shot.    We will call pt regarding lab results. f/u as scheduled. DC smoking cigarettes. Recommend mammogram

## 2023-09-26 ENCOUNTER — TELEPHONE (OUTPATIENT)
Dept: PRIMARY CARE | Facility: CLINIC | Age: 53
End: 2023-09-26
Payer: COMMERCIAL

## 2023-09-27 ENCOUNTER — OFFICE VISIT (OUTPATIENT)
Dept: PRIMARY CARE | Facility: CLINIC | Age: 53
End: 2023-09-27
Payer: COMMERCIAL

## 2023-09-27 ENCOUNTER — TELEPHONE (OUTPATIENT)
Dept: PRIMARY CARE | Facility: CLINIC | Age: 53
End: 2023-09-27

## 2023-09-27 VITALS — HEART RATE: 72 BPM | SYSTOLIC BLOOD PRESSURE: 132 MMHG | RESPIRATION RATE: 14 BRPM | DIASTOLIC BLOOD PRESSURE: 76 MMHG

## 2023-09-27 DIAGNOSIS — F32.A DEPRESSION, UNSPECIFIED DEPRESSION TYPE: ICD-10-CM

## 2023-09-27 DIAGNOSIS — E03.9 ACQUIRED HYPOTHYROIDISM: ICD-10-CM

## 2023-09-27 DIAGNOSIS — F17.210 SMOKES 1 TO 10 CIGARETTES PER DAY: ICD-10-CM

## 2023-09-27 DIAGNOSIS — E78.2 COMBINED HYPERLIPIDEMIA: ICD-10-CM

## 2023-09-27 DIAGNOSIS — G89.29 CHRONIC BILATERAL LOW BACK PAIN WITHOUT SCIATICA: ICD-10-CM

## 2023-09-27 DIAGNOSIS — M25.512 CHRONIC LEFT SHOULDER PAIN: ICD-10-CM

## 2023-09-27 DIAGNOSIS — R39.15 URINARY URGENCY: ICD-10-CM

## 2023-09-27 DIAGNOSIS — G89.29 CHRONIC LEFT SHOULDER PAIN: ICD-10-CM

## 2023-09-27 DIAGNOSIS — M94.0 COSTOCHONDRITIS, ACUTE: Primary | ICD-10-CM

## 2023-09-27 DIAGNOSIS — M54.50 CHRONIC BILATERAL LOW BACK PAIN WITHOUT SCIATICA: ICD-10-CM

## 2023-09-27 PROCEDURE — 96372 THER/PROPH/DIAG INJ SC/IM: CPT | Performed by: FAMILY MEDICINE

## 2023-09-27 PROCEDURE — 99406 BEHAV CHNG SMOKING 3-10 MIN: CPT | Performed by: FAMILY MEDICINE

## 2023-09-27 PROCEDURE — 99214 OFFICE O/P EST MOD 30 MIN: CPT | Performed by: FAMILY MEDICINE

## 2023-09-27 RX ORDER — LEVOTHYROXINE SODIUM 112 UG/1
112 TABLET ORAL
Qty: 90 TABLET | Refills: 3 | Status: SHIPPED | OUTPATIENT
Start: 2023-09-27

## 2023-09-27 RX ORDER — ATORVASTATIN CALCIUM 40 MG/1
40 TABLET, FILM COATED ORAL NIGHTLY
Qty: 90 TABLET | Refills: 3 | Status: SHIPPED | OUTPATIENT
Start: 2023-09-27

## 2023-09-27 RX ORDER — CYCLOBENZAPRINE HCL 10 MG
10 TABLET ORAL DAILY
Qty: 90 TABLET | Refills: 1 | Status: SHIPPED | OUTPATIENT
Start: 2023-09-27

## 2023-09-27 RX ORDER — OXYBUTYNIN CHLORIDE 5 MG/1
5 TABLET ORAL 2 TIMES DAILY
Qty: 180 TABLET | Refills: 3 | Status: SHIPPED | OUTPATIENT
Start: 2023-09-27

## 2023-09-27 RX ORDER — KETOROLAC TROMETHAMINE 30 MG/ML
30 INJECTION, SOLUTION INTRAMUSCULAR; INTRAVENOUS ONCE
Status: COMPLETED | OUTPATIENT
Start: 2023-09-27 | End: 2023-09-27

## 2023-09-27 RX ORDER — KETOROLAC TROMETHAMINE 10 MG/1
10 TABLET, FILM COATED ORAL 3 TIMES DAILY
Qty: 15 TABLET | Refills: 0 | Status: SHIPPED | OUTPATIENT
Start: 2023-09-27 | End: 2024-01-08 | Stop reason: SDUPTHER

## 2023-09-27 RX ORDER — VENLAFAXINE HYDROCHLORIDE 150 MG/1
CAPSULE, EXTENDED RELEASE ORAL
Qty: 90 CAPSULE | Refills: 3 | Status: SHIPPED | OUTPATIENT
Start: 2023-09-27

## 2023-09-27 RX ADMIN — KETOROLAC TROMETHAMINE 30 MG: 30 INJECTION, SOLUTION INTRAMUSCULAR; INTRAVENOUS at 14:28

## 2023-09-27 NOTE — PROGRESS NOTES
Subjective   Patient ID: Catherine Cano is a 53 y.o. female who presents for front chest wall , left shoulder and low back pain    HPI   Chronic low back and left shoulder dull pain was worse at 7-9/10. the pain was localized to low back and  left shoulder with decreased rom.  pt denies ext weakness or numbness. pt controlled BM  well. pt stated that walking or bending  over increased the pain. no  falls. Pt also started to have front chest wall pain. Palpation of chest wall made the pain worse. No cough or sob or heart palpitation      Review of Systems    Objective   /76   Pulse 72   Resp 14     Physical Exam  Mild  distress, well groomed, No sclera icterus. normal respiration, lungs: CTA b/l, heart: RRR, no LE  edema, normal pedal pulses, abd: soft, no tenderness, BS+, mild low back, left shoulder and rt front chest wall tenderness, no local erythema at chest wall. Decreased rom at left shoulder. DTR were normal at the knees, SLR test was negative, normal strength and sensation at extremities, good balance, No depressed mood.        Assessment/Plan     Pain at left shoulder, low back and chest wall. Toradol 30mg IM and toradol tab as dir, caution of SE of GI bleeding and kidney damage from taking toradol tab. Recommend pain specialist eval.   Nicotine dependence counseling: patient  smokes cigarettes.  I discussed with patient that  tobacco addiction increases the risks of COPD (emphysema), heart attack, stroke, Peripheral artery disease, and cancer etc. Treatment options such as behavioral counseling (specialty clinic, smoking cessation program, 1-800-QUIT-NOW) and medications (Zyban, chantix and Nicotine replacement therapy) were  discussed with the patient who is considering to quit. Nicotine withdrawal symptoms such as  increased appetite and weight gain, changes in mood (dysphoria or depression), insomnia, irritability, anxiety, difficulty concentrating and restlessness were discussed with  patient. Inform patient that Nicotine withdrawal symptoms peak in the first three days of quitting and subside over three to four weeks. More than 3 minutes' discussion and counseling.

## 2023-09-29 ENCOUNTER — TELEPHONE (OUTPATIENT)
Dept: PRIMARY CARE | Facility: CLINIC | Age: 53
End: 2023-09-29
Payer: COMMERCIAL

## 2023-09-29 NOTE — TELEPHONE ENCOUNTER
Catherine's meds will not be approved for 90 days supply unless you do a verbal prior auth will you do this or should she just do the 30 day supply. [phone]

## 2023-10-06 ENCOUNTER — LAB (OUTPATIENT)
Dept: LAB | Facility: LAB | Age: 53
End: 2023-10-06
Payer: COMMERCIAL

## 2023-10-06 DIAGNOSIS — Z00.00 ROUTINE MEDICAL EXAM: ICD-10-CM

## 2023-10-06 LAB
ALBUMIN SERPL BCP-MCNC: 4 G/DL (ref 3.4–5)
ALP SERPL-CCNC: 36 U/L (ref 33–110)
ALT SERPL W P-5'-P-CCNC: 16 U/L (ref 7–45)
ANION GAP SERPL CALC-SCNC: 14 MMOL/L (ref 10–20)
AST SERPL W P-5'-P-CCNC: 12 U/L (ref 9–39)
BILIRUB SERPL-MCNC: 0.3 MG/DL (ref 0–1.2)
BUN SERPL-MCNC: 17 MG/DL (ref 6–23)
CALCIUM SERPL-MCNC: 8.8 MG/DL (ref 8.6–10.3)
CHLORIDE SERPL-SCNC: 108 MMOL/L (ref 98–107)
CHOLEST SERPL-MCNC: 230 MG/DL (ref 0–199)
CHOLESTEROL/HDL RATIO: 6.9
CO2 SERPL-SCNC: 21 MMOL/L (ref 21–32)
CREAT SERPL-MCNC: 1.11 MG/DL (ref 0.5–1.05)
ERYTHROCYTE [DISTWIDTH] IN BLOOD BY AUTOMATED COUNT: 14.6 % (ref 11.5–14.5)
GFR SERPL CREATININE-BSD FRML MDRD: 59 ML/MIN/1.73M*2
GLUCOSE SERPL-MCNC: 78 MG/DL (ref 74–99)
HCT VFR BLD AUTO: 41.1 % (ref 36–46)
HDLC SERPL-MCNC: 33.2 MG/DL
HGB BLD-MCNC: 13.6 G/DL (ref 12–16)
LDLC SERPL CALC-MCNC: 126 MG/DL (ref 140–190)
MCH RBC QN AUTO: 33.3 PG (ref 26–34)
MCHC RBC AUTO-ENTMCNC: 33.1 G/DL (ref 32–36)
MCV RBC AUTO: 101 FL (ref 80–100)
NON HDL CHOLESTEROL: 197 MG/DL (ref 0–149)
NRBC BLD-RTO: 0 /100 WBCS (ref 0–0)
PLATELET # BLD AUTO: 293 X10*3/UL (ref 150–450)
PMV BLD AUTO: 10.1 FL (ref 7.5–11.5)
POTASSIUM SERPL-SCNC: 4.5 MMOL/L (ref 3.5–5.3)
PROT SERPL-MCNC: 6.8 G/DL (ref 6.4–8.2)
RBC # BLD AUTO: 4.08 X10*6/UL (ref 4–5.2)
SODIUM SERPL-SCNC: 138 MMOL/L (ref 136–145)
TRIGL SERPL-MCNC: 353 MG/DL (ref 0–149)
TSH SERPL-ACNC: 0.66 MIU/L (ref 0.44–3.98)
VLDL: 71 MG/DL (ref 0–40)
WBC # BLD AUTO: 8.2 X10*3/UL (ref 4.4–11.3)

## 2023-10-06 PROCEDURE — 84443 ASSAY THYROID STIM HORMONE: CPT

## 2023-10-06 PROCEDURE — 36415 COLL VENOUS BLD VENIPUNCTURE: CPT

## 2023-10-06 PROCEDURE — 80061 LIPID PANEL: CPT

## 2023-10-06 PROCEDURE — 85027 COMPLETE CBC AUTOMATED: CPT

## 2023-10-06 PROCEDURE — 80053 COMPREHEN METABOLIC PANEL: CPT

## 2023-10-31 ENCOUNTER — TELEPHONE (OUTPATIENT)
Dept: PRIMARY CARE | Facility: CLINIC | Age: 53
End: 2023-10-31
Payer: COMMERCIAL

## 2023-10-31 NOTE — TELEPHONE ENCOUNTER
Needs a refill on her Toradol 10 mg takes it 2 times a day please send to [facility] in [place] and she is raking leaves legs hurts and has a lot of pain in her left shoulder and she stated that if you need to see her she can do a virtual visit.

## 2023-11-01 ENCOUNTER — TELEMEDICINE (OUTPATIENT)
Dept: PRIMARY CARE | Facility: CLINIC | Age: 53
End: 2023-11-01
Payer: COMMERCIAL

## 2023-11-01 DIAGNOSIS — M25.512 CHRONIC LEFT SHOULDER PAIN: Primary | ICD-10-CM

## 2023-11-01 DIAGNOSIS — Z12.11 COLON CANCER SCREENING: ICD-10-CM

## 2023-11-01 DIAGNOSIS — G89.29 CHRONIC LEFT SHOULDER PAIN: Primary | ICD-10-CM

## 2023-11-01 PROCEDURE — 99213 OFFICE O/P EST LOW 20 MIN: CPT | Performed by: FAMILY MEDICINE

## 2023-11-01 RX ORDER — KETOROLAC TROMETHAMINE 10 MG/1
10 TABLET, FILM COATED ORAL EVERY 8 HOURS PRN
Qty: 15 TABLET | Refills: 0 | Status: SHIPPED | OUTPATIENT
Start: 2023-11-01 | End: 2023-11-06

## 2023-11-01 NOTE — PROGRESS NOTES
Subjective   Patient ID: Catherine Cano is a 53 y.o. female who presents for worse left shoulder pain  HPI   Pt had worse left shoulder pain at 10/10 in the past few days. Raising arm increased the pain. Pt had normal sensation and strength at left arm  Review of Systems    Objective   There were no vitals taken for this visit.    Physical Exam    Assessment/Plan     Worse left shoulder pain.  cold compress,   start toradol tab as dir. caution of SE from toradol such as kidney damage and stomach bleeding. take toradol  with foods. Pain specialist kristie

## 2023-12-11 ENCOUNTER — TELEPHONE (OUTPATIENT)
Dept: PRIMARY CARE | Facility: CLINIC | Age: 53
End: 2023-12-11
Payer: COMMERCIAL

## 2023-12-11 NOTE — TELEPHONE ENCOUNTER
"Pt called and stated that she keeps getting cold sores in her nose. She said you \"gave her a purple pill\" that helped with the other one.    Also, she would like a shot of torodol for her left shoulder and lower back. Please advise.  "

## 2023-12-12 ENCOUNTER — APPOINTMENT (OUTPATIENT)
Dept: PRIMARY CARE | Facility: CLINIC | Age: 53
End: 2023-12-12
Payer: COMMERCIAL

## 2023-12-13 ENCOUNTER — HOSPITAL ENCOUNTER (OUTPATIENT)
Dept: RADIOLOGY | Facility: HOSPITAL | Age: 53
Discharge: HOME | End: 2023-12-13
Payer: COMMERCIAL

## 2023-12-13 ENCOUNTER — OFFICE VISIT (OUTPATIENT)
Dept: PRIMARY CARE | Facility: CLINIC | Age: 53
End: 2023-12-13
Payer: COMMERCIAL

## 2023-12-13 VITALS — SYSTOLIC BLOOD PRESSURE: 132 MMHG | DIASTOLIC BLOOD PRESSURE: 77 MMHG | HEART RATE: 80 BPM

## 2023-12-13 DIAGNOSIS — G89.29 CHRONIC LEFT SHOULDER PAIN: ICD-10-CM

## 2023-12-13 DIAGNOSIS — B00.1 HERPES LABIALIS: ICD-10-CM

## 2023-12-13 DIAGNOSIS — G89.29 CHRONIC LEFT SHOULDER PAIN: Primary | ICD-10-CM

## 2023-12-13 DIAGNOSIS — M25.512 CHRONIC LEFT SHOULDER PAIN: ICD-10-CM

## 2023-12-13 DIAGNOSIS — M25.512 CHRONIC LEFT SHOULDER PAIN: Primary | ICD-10-CM

## 2023-12-13 PROCEDURE — 73030 X-RAY EXAM OF SHOULDER: CPT | Mod: LEFT SIDE | Performed by: STUDENT IN AN ORGANIZED HEALTH CARE EDUCATION/TRAINING PROGRAM

## 2023-12-13 PROCEDURE — 99214 OFFICE O/P EST MOD 30 MIN: CPT | Performed by: FAMILY MEDICINE

## 2023-12-13 PROCEDURE — 73030 X-RAY EXAM OF SHOULDER: CPT | Mod: LT,FY

## 2023-12-13 RX ORDER — KETOROLAC TROMETHAMINE 10 MG/1
10 TABLET, FILM COATED ORAL 4 TIMES DAILY PRN
Qty: 15 TABLET | Refills: 0 | Status: SHIPPED | OUTPATIENT
Start: 2023-12-13 | End: 2023-12-18

## 2023-12-13 RX ORDER — VALACYCLOVIR HYDROCHLORIDE 1 G/1
2000 TABLET, FILM COATED ORAL 2 TIMES DAILY
Qty: 4 TABLET | Refills: 0 | Status: SHIPPED | OUTPATIENT
Start: 2023-12-13 | End: 2023-12-14

## 2023-12-13 NOTE — ASSESSMENT & PLAN NOTE
Chronic left shoulder pain which got worse lately. Toradol tab as dir.  cold compress, stretch exercise.  Check xr. caution of SE from Toradol tab such as kidney damage and stomach bleeding. take Toradol tab with foods. Dc smoking

## 2023-12-13 NOTE — PROGRESS NOTES
Subjective   Patient ID: Catherine Cano is a 53 y.o. female who presents for left shoulder pain and sores at nose    HPI   Chronic left shoulder dull pain was worse at 7-10/10 in the past mos. No recent shoulder injury. pt denies left arm weakness or numbness. Raising left arm  over increased the pain. Pt noticed burning sores at tip of her nose in the past few days. No blurry vision or pain in the eyes    Review of Systems    Objective   /77   Pulse 80     Physical Exam  No distress, well groomed, No sclera icterus. No cervical or axillary lymphadenopathy. normal respiration, lungs: CTA b/l, heart: RRR, no LE  edema, normal pedal pulses, abd: soft, no tenderness, BS+, moderate left shoulder tenderness with decreased rom,  normal strength and sensation at left arm there were blistery sores at tip of nose with oozing and tenderness, good balance, No depressed mood.    Assessment/Plan   Problem List Items Addressed This Visit             ICD-10-CM    Herpes labialis B00.1     Valtrex as dir. Call office if symptoms do not resolve in 7 days           Relevant Medications    valACYclovir (Valtrex) 1 gram tablet    Chronic left shoulder pain - Primary M25.512, G89.29     Chronic left shoulder pain which got worse lately. Toradol tab as dir.  cold compress, stretch exercise.  Check xr. caution of SE from Toradol tab such as kidney damage and stomach bleeding. take Toradol tab with foods. Dc smoking         Relevant Medications    ketorolac (Toradol) 10 mg tablet

## 2023-12-14 ENCOUNTER — TELEPHONE (OUTPATIENT)
Dept: PRIMARY CARE | Facility: CLINIC | Age: 53
End: 2023-12-14

## 2023-12-14 ENCOUNTER — APPOINTMENT (OUTPATIENT)
Dept: PRIMARY CARE | Facility: CLINIC | Age: 53
End: 2023-12-14
Payer: COMMERCIAL

## 2024-01-08 ENCOUNTER — OFFICE VISIT (OUTPATIENT)
Dept: PRIMARY CARE | Facility: CLINIC | Age: 54
End: 2024-01-08
Payer: COMMERCIAL

## 2024-01-08 VITALS — SYSTOLIC BLOOD PRESSURE: 125 MMHG | DIASTOLIC BLOOD PRESSURE: 76 MMHG

## 2024-01-08 DIAGNOSIS — M54.50 CHRONIC BILATERAL LOW BACK PAIN WITHOUT SCIATICA: ICD-10-CM

## 2024-01-08 DIAGNOSIS — M25.512 CHRONIC LEFT SHOULDER PAIN: Primary | ICD-10-CM

## 2024-01-08 DIAGNOSIS — F17.210 SMOKES 1 TO 10 CIGARETTES PER DAY: ICD-10-CM

## 2024-01-08 DIAGNOSIS — G89.29 CHRONIC LEFT SHOULDER PAIN: Primary | ICD-10-CM

## 2024-01-08 DIAGNOSIS — G89.29 CHRONIC BILATERAL LOW BACK PAIN WITHOUT SCIATICA: ICD-10-CM

## 2024-01-08 PROCEDURE — 20610 DRAIN/INJ JOINT/BURSA W/O US: CPT | Performed by: FAMILY MEDICINE

## 2024-01-08 PROCEDURE — 99214 OFFICE O/P EST MOD 30 MIN: CPT | Performed by: FAMILY MEDICINE

## 2024-01-08 RX ORDER — TRIAMCINOLONE ACETONIDE 40 MG/ML
40 INJECTION, SUSPENSION INTRA-ARTICULAR; INTRAMUSCULAR ONCE
Status: COMPLETED | OUTPATIENT
Start: 2024-01-08 | End: 2024-01-08

## 2024-01-08 RX ORDER — KETOROLAC TROMETHAMINE 10 MG/1
10 TABLET, FILM COATED ORAL 3 TIMES DAILY
Qty: 15 TABLET | Refills: 0 | Status: SHIPPED | OUTPATIENT
Start: 2024-01-08 | End: 2024-06-10 | Stop reason: SDUPTHER

## 2024-01-08 RX ADMIN — TRIAMCINOLONE ACETONIDE 40 MG: 40 INJECTION, SUSPENSION INTRA-ARTICULAR; INTRAMUSCULAR at 16:49

## 2024-02-15 ENCOUNTER — TELEPHONE (OUTPATIENT)
Dept: PRIMARY CARE | Facility: CLINIC | Age: 54
End: 2024-02-15
Payer: COMMERCIAL

## 2024-02-15 NOTE — TELEPHONE ENCOUNTER
Pt called in and is having extreme pain in shoulder. Pt requested refill on   ketorolac (Toradol) 10 mg tablet  Or something stronger?

## 2024-02-27 ENCOUNTER — TELEPHONE (OUTPATIENT)
Dept: PRIMARY CARE | Facility: CLINIC | Age: 54
End: 2024-02-27
Payer: COMMERCIAL

## 2024-02-28 ENCOUNTER — TELEMEDICINE (OUTPATIENT)
Dept: PRIMARY CARE | Facility: CLINIC | Age: 54
End: 2024-02-28
Payer: COMMERCIAL

## 2024-02-28 DIAGNOSIS — M54.9 MID BACK PAIN: ICD-10-CM

## 2024-02-28 DIAGNOSIS — M79.662 PAIN AND SWELLING OF LOWER LEG, LEFT: ICD-10-CM

## 2024-02-28 DIAGNOSIS — G89.29 CHRONIC LEFT SHOULDER PAIN: Primary | ICD-10-CM

## 2024-02-28 DIAGNOSIS — M79.89 PAIN AND SWELLING OF LOWER LEG, LEFT: ICD-10-CM

## 2024-02-28 DIAGNOSIS — M25.512 CHRONIC LEFT SHOULDER PAIN: Primary | ICD-10-CM

## 2024-02-28 PROCEDURE — 99214 OFFICE O/P EST MOD 30 MIN: CPT | Performed by: FAMILY MEDICINE

## 2024-02-28 RX ORDER — KETOROLAC TROMETHAMINE 10 MG/1
10 TABLET, FILM COATED ORAL EVERY 8 HOURS PRN
Qty: 15 TABLET | Refills: 0 | Status: SHIPPED | OUTPATIENT
Start: 2024-02-28 | End: 2024-03-04

## 2024-02-28 NOTE — ASSESSMENT & PLAN NOTE
toradol tab as dir. caution of SE from toradol such as kidney damage and stomach bleeding. take toradol with foods.

## 2024-02-28 NOTE — PROGRESS NOTES
Subjective   Patient ID: Catherine Cano is a 53 y.o. female who presents for left shoulder and mid back pain got worse 10 days ago.     HPI   left shoulder and mid back pain got worse 10 days ago. LLE swelling 3 days. No sob, cp. Raising left arm and bending over made the pain worse left shoulder and mid back respectively.   Review of Systems    Objective   There were no vitals taken for this visit.    Physical Exam    Assessment/Plan   Problem List Items Addressed This Visit             ICD-10-CM    Mid back pain M54.9    Relevant Medications    ketorolac (Toradol) 10 mg tablet    Other Relevant Orders    Referral to Pain Medicine    Chronic left shoulder pain - Primary M25.512, G89.29     toradol tab as dir. caution of SE from toradol such as kidney damage and stomach bleeding. take toradol with foods.            Relevant Medications    ketorolac (Toradol) 10 mg tablet    Other Relevant Orders    Referral to Orthopaedic Surgery    Pain and swelling of lower leg, left M79.662, M79.89     Concerning DVT. Recommend to go to er for eval asap

## 2024-04-02 RX ORDER — TRAZODONE HYDROCHLORIDE 50 MG/1
50 TABLET ORAL NIGHTLY PRN
Qty: 90 TABLET | Refills: 3 | OUTPATIENT
Start: 2024-04-02

## 2024-04-05 ENCOUNTER — TELEMEDICINE (OUTPATIENT)
Dept: PRIMARY CARE | Facility: CLINIC | Age: 54
End: 2024-04-05
Payer: COMMERCIAL

## 2024-04-05 ENCOUNTER — TELEPHONE (OUTPATIENT)
Dept: PRIMARY CARE | Facility: CLINIC | Age: 54
End: 2024-04-05
Payer: COMMERCIAL

## 2024-04-05 DIAGNOSIS — F51.04 CHRONIC INSOMNIA: Primary | ICD-10-CM

## 2024-04-05 DIAGNOSIS — G89.29 CHRONIC LEFT SHOULDER PAIN: ICD-10-CM

## 2024-04-05 DIAGNOSIS — M25.512 CHRONIC LEFT SHOULDER PAIN: ICD-10-CM

## 2024-04-05 DIAGNOSIS — R39.15 URINARY URGENCY: ICD-10-CM

## 2024-04-05 DIAGNOSIS — M54.31 SCIATICA OF RIGHT SIDE: ICD-10-CM

## 2024-04-05 PROCEDURE — 99214 OFFICE O/P EST MOD 30 MIN: CPT | Performed by: FAMILY MEDICINE

## 2024-04-05 RX ORDER — TRAZODONE HYDROCHLORIDE 50 MG/1
50 TABLET ORAL NIGHTLY
Qty: 30 TABLET | Refills: 0 | Status: SHIPPED | OUTPATIENT
Start: 2024-04-05

## 2024-04-05 RX ORDER — KETOROLAC TROMETHAMINE 10 MG/1
10 TABLET, FILM COATED ORAL EVERY 8 HOURS PRN
Qty: 15 TABLET | Refills: 0 | Status: SHIPPED | OUTPATIENT
Start: 2024-04-05

## 2024-04-05 RX ORDER — TRAZODONE HYDROCHLORIDE 50 MG/1
TABLET ORAL EVERY 24 HOURS
COMMUNITY
Start: 2018-06-02 | End: 2024-04-05 | Stop reason: SDUPTHER

## 2024-04-05 NOTE — PROGRESS NOTES
Subjective   Patient ID: Catherine Cano is a 53 y.o. female who presents for fu    HPI   Trazodone controlled trouble falling and staying asleep well. Pt would like to cont trazodone for her insomnia. Pt has snores at night. Pain at left shoulder and low back was worse at 10/10 lately. pt noticed low abd tenderness, urinary frequency and urgency 3 days ago.  no dysuria, hematuria, flank pain, fever or chills. No nausea, vomiting. No cp, sob, HA. Normal appetite. Symptoms persisted today        Review of Systems    Objective   There were no vitals taken for this visit.    Physical Exam    Assessment/Plan     Insomnia, controlled with trazodone. Recommend psychology counseling.  Recommend good sleep hygiene. Caution side effects of trazodone such as feeling sleepy or tired, headaches, nausea, constipation, dry mouth etc. Refer to  sleep specialist evaluation.  Chronic left shoulder and  low back pain which got worse lately, stretch exercise.  start ketorolac  as dir. caution of SE from ketorolac  such as kidney damage and stomach bleeding. take ketorolac  with foods. Prilosec 40mg every day for stomach protection  Urinary urgency,   Will order  urine  test for eval.  Increase fluid intake. Call office if worsening symptoms, sob, cp, flank pain, nausea, fever or chills occur.

## 2024-04-05 NOTE — TELEPHONE ENCOUNTER
Pt would like to know why her   ketorolac (Toradol) 10 mg tablet   Trazadone is not being filled.

## 2024-04-07 ENCOUNTER — LAB REQUISITION (OUTPATIENT)
Dept: LAB | Facility: HOSPITAL | Age: 54
End: 2024-04-07
Payer: COMMERCIAL

## 2024-04-07 DIAGNOSIS — N39.0 URINARY TRACT INFECTION, SITE NOT SPECIFIED: ICD-10-CM

## 2024-04-07 PROCEDURE — 87086 URINE CULTURE/COLONY COUNT: CPT

## 2024-04-09 LAB — BACTERIA UR CULT: NO GROWTH

## 2024-04-22 ENCOUNTER — TELEPHONE (OUTPATIENT)
Dept: PRIMARY CARE | Facility: CLINIC | Age: 54
End: 2024-04-22
Payer: COMMERCIAL

## 2024-04-22 DIAGNOSIS — G89.29 CHRONIC BILATERAL LOW BACK PAIN WITHOUT SCIATICA: Primary | ICD-10-CM

## 2024-04-22 DIAGNOSIS — M54.50 CHRONIC BILATERAL LOW BACK PAIN WITHOUT SCIATICA: Primary | ICD-10-CM

## 2024-04-22 NOTE — TELEPHONE ENCOUNTER
Pt had to reschedule with pain management. The office is trying to reschedule her but there is an expiration of May 1 on the referral.

## 2024-04-29 NOTE — PROGRESS NOTES
History of Present Complaint:  The patient was referred to us by Referring Provider: Boogie Herrera MD PhD Family Medicine.  For lower back pain.  This is 53 y.o.  female {Accompanied by:[id number]}with a past history of anxiety and depression, multiple musculoskeletal complaints with L3-S1 fusion and spinal cord stimulator implant presenting with {kt mrdica symptoms:73555}    Pain started {pain onset:16220}  Pain is {Better or worse:82455}   Patient history significant for the following red flags: {Red flags:35502}  The pain is described as {Pain description:80314} and is relieved by {pain relief:52564}      Prior Pain Therapies: {Prior pain therapy:87167}    Past surgical history:  {Past surgical history:94073}           Procedures:   *** the patient has had a ***% improvement in pain.    Portions of record reviewed for pertinent issues: active problem list, medication list, allergies, family history, social history, notes from last encounter, encounters, lab results, imaging and other available records.    I have personally reviewed the OARRS report for this patient. This report is scanned into the electronic medical record. I have considered the risks of abuse, dependence, addiction and diversion. It showed: Multiple small amount of Percocet from different providers  OPIOID RISK ASSESSMENT SCORE ***/26  Opioid agreement: ***  Activities of daily living: ***  Adverse effects: ***  Analgesia: W/O ***/10, W ***/10  {***}  Toxicology screen: ***  Aberrant behavior: ***      Diagnostic studies:  8/16/2023 T12 vertebroplasty, spinal cord stimulator leads implanted L3-S1 fusion and severe desiccation and spondylolisthesis at the junctional level L2-3      Employment/disability/litigation: {ktlitigation:36250}    Social History:  {KT social history:29100}       Review of Systems       Physical Exam       Assessment  ***           Plan  At least 50% of the visit was involved in the discussion of the options for treatment. We  discussed exercises, medication, interventional therapies and surgery. Healthy life style is essential with patient hard work to achieve the wellness. In addition; discussion with the patient and/or family about any of the diagnostic results, impressions and/or recommended diagnostic studies, prognosis, risks and benefits of treatment options, instructions for treatment and/or follow-up, importance of compliance with chosen treatment options, risk-factor reduction, and patient/family education.         Pool therapy, walking in the pool, at least 3x per week for 30 minutes  *** Smoking cessation  Healthy lifestyle and anti-inflammatory diet in addition to weight control discussed with the patient  Alternative chronic pain therapies was discussed, encouraged and information was handed  Return to Clinic 3 months       *Please note this report has been produced using speech recognition software and may contain errors related to that system including grammar, punctuation and spelling as well as words and phrases that may be inappropriate. If there are questions or concerns, please feel free to contact me to clarify.    Adis Villavicencio MD

## 2024-05-02 ENCOUNTER — APPOINTMENT (OUTPATIENT)
Dept: PAIN MEDICINE | Facility: CLINIC | Age: 54
End: 2024-05-02
Payer: COMMERCIAL

## 2024-05-13 NOTE — PROGRESS NOTES
History of Present Complaint:  The patient was referred to us by Referring Provider: Boogie Herrera MD PhD for lower back pain and bilateral sciatic. this is 53 y.o.  female with a past history of anxiety, depression, Hypothyroidism, and smoker  patient had spinal cord stimulator implanted for postlaminectomy syndrome with L3-S1 fusion by Mike Nelson on 12/1/2020 and received bilateral L2-3 TFESI by Kelley Mary without good benefit presenting with lower back pain in addition to intractable left shoulder pain after her dog jumped on her shoulder.  The patient has a history of chronic pain with compression fracture at T12 with kyphoplasty done there which still giving her problems with her lower back.  The patient had fusion of her lumbar spine that continued to have problem with the lower back and lower extremity pain.  She had spinal cord stimulator [employer] implanted with Infinion leads with the leads in the middle of T7 in good position but the stimulation is not covering the lower back and it works good for her lower extremities and the IPG is uncomfortable in her gluteal area underneath the skin.  The patient has no incontinence no saddle paresthesia and no paralysis.  The patient has no red flags      Procedures:   2022 [employer] Infinion leads spinal cord stimulator implant  2/11/2022 bilateral L2 TFESI by Kelley Mary the patient has had a 0% improvement in pain.    Portions of record reviewed for pertinent issues: active problem list, medication list, allergies, family history, social history, notes from last encounter, encounters, lab results, imaging and other available records.    I have personally reviewed the OARRS report for this patient. This report is scanned into the electronic medical record. I have considered the risks of abuse, dependence, addiction and diversion. It showed: Few Percocet 5 mg from different providers since 2022  OPIOID RISK ASSESSMENT SCORE    Aberrant behavior: none      Diagnostic studies:  2023 left shoulder x-ray was within normal limits  2023 x-ray of the lumbar spine showed L4-5 laminectomy with L3-S1 fusion with spinal cord stimulator  2020 MRI of the lumbar spine report showed satisfactory fusion L4-S1 just mild disease at L2-3:  FINDINGS:  Again, note is made of changes consistent with previous posterior  laminectomy at the L4-5 and L5-S1 levels. Bilateral pedicle screws  and rods are again seen extending from the L3 level through the S1  level.     L5-S1: Again, note is made of mild hypertrophic facet changes  bilaterally. There is no significant central canal or neural  foraminal stenosis.     L4-5: Again, note is made of mild hypertrophic facet changes  bilaterally without significant canal or foraminal stenosis.     L3-4: The findings are unremarkable.     L2-3: There is been interval development of a central disc herniation  superimposed upon diffuse disc bulge. On the current examination  there is mild-to-moderate central canal stenosis. The neural foramina  are patent.     L1-2: The findings are unremarkable.     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     Eyes: Negative.    Respiratory: Negative.     Cardiovascular: Negative.    Gastrointestinal: Negative.    Endocrine: Negative.    Genitourinary: Negative.    Musculoskeletal:  Positive for arthralgias, back pain and myalgias.   Skin: Negative.    Hematological: Negative.    Psychiatric/Behavioral: Negative.             Physical Exam  Vitals and nursing note reviewed.   Constitutional:       Appearance: Normal appearance.       HENT:      Head: Normocephalic and atraumatic.      Nose: Nose normal.   Eyes:      Extraocular Movements: Extraocular movements intact.      Conjunctiva/sclera: Conjunctivae normal.      Pupils: Pupils are equal, round, and reactive to light.   Cardiovascular:      Rate and Rhythm: Normal rate and regular rhythm.      Pulses: Normal pulses.      Heart sounds: Normal heart sounds.   Pulmonary:      Effort: Pulmonary effort is normal.      Breath sounds: Normal breath sounds.   Abdominal:      General: Abdomen is flat. Bowel sounds are normal.      Palpations: Abdomen is soft.   Musculoskeletal:         General: Tenderness and deformity present.   Skin:     General: Skin is warm.   Neurological:      General: No focal deficit present.      Mental Status: She is alert and oriented to person, place, and time.   Psychiatric:         Mood and Affect: Mood normal.         Behavior: Behavior normal.            Assessment  Pleasant 53 years old lady who has significant history of postlaminectomy syndrome of the lumbar spine in addition to T12 compression fracture treated with kyphoplasty.  The patient has fusion extent L3-S1 followed by [employer] Infinion lead spinal cord stimulator implant that helps with the lower extremity but does not affect the lower back pain.  The patient has significant left shoulder pain almost looks like CRPS with hyperalgesia and allodynia.  The patient was diagnosed with frozen shoulder.  She had shoulder injection without much benefit.  Will plan on ketorolac injection and will start on a neuro supplement in addition to IV infusion therapy.  Will start her on nortriptyline at night and the patient to take her Effexor in the morning.  Left shoulder suprascapular nerve block as soon as possible.  Will contact [employer] for spinal cord stimulator adjustment and  may be revision of her IPG in the future.           Plan  At least 50% of the visit was involved in the discussion of the options for treatment. We discussed exercises, medication, interventional therapies and surgery. Healthy life style is essential with patient hard work to achieve the wellness. In addition; discussion with the patient and/or family about any of the diagnostic results, impressions and/or recommended diagnostic studies, prognosis, risks and benefits of treatment options, instructions for treatment and/or follow-up, importance of compliance with chosen treatment options, risk-factor reduction, and patient/family education.         Pool therapy, walking in the pool, at least 3x per week for 30 minutes  Smoking cessation  Neuro supplement ordered  Ketorolac 60 mg IM today  Ketorolac 10 mg p.o. to follow  Nortriptyline 25 mg at bedtime may increase to 75 mg at bedtime patient takes Wellbutrin 150 mg every morning  IV infusion therapy once every 2 weeks  Contacted [employer] to adjust the spinal cord stimulator and possible revision of IPG pocket in the future  Healthy lifestyle and anti-inflammatory diet in addition to weight control discussed with the patient  Alternative chronic pain therapies was discussed, encouraged and information was handed  Return to Clinic 3 months       *Please note this report has been produced using speech recognition software and may contain errors related to that system including grammar, punctuation and spelling as well as words and phrases that may be inappropriate. If there are questions or concerns, please feel free to contact me to clarify.    Adis Villavicencio MD

## 2024-05-13 NOTE — H&P (VIEW-ONLY)
History of Present Complaint:  The patient was referred to us by Referring Provider: Boogie Herrera MD PhD for lower back pain and bilateral sciatic. this is 53 y.o.  female with a past history of anxiety, depression, Hypothyroidism, and smoker  patient had spinal cord stimulator implanted for postlaminectomy syndrome with L3-S1 fusion by Mike Nelson on 12/1/2020 and received bilateral L2-3 TFESI by Kelley Mary without good benefit presenting with lower back pain in addition to intractable left shoulder pain after her dog jumped on her shoulder.  The patient has a history of chronic pain with compression fracture at T12 with kyphoplasty done there which still giving her problems with her lower back.  The patient had fusion of her lumbar spine that continued to have problem with the lower back and lower extremity pain.  She had spinal cord stimulator [employer] implanted with Infinion leads with the leads in the middle of T7 in good position but the stimulation is not covering the lower back and it works good for her lower extremities and the IPG is uncomfortable in her gluteal area underneath the skin.  The patient has no incontinence no saddle paresthesia and no paralysis.  The patient has no red flags      Procedures:   2022 [employer] Infinion leads spinal cord stimulator implant  2/11/2022 bilateral L2 TFESI by Kelley Mary the patient has had a 0% improvement in pain.    Portions of record reviewed for pertinent issues: active problem list, medication list, allergies, family history, social history, notes from last encounter, encounters, lab results, imaging and other available records.    I have personally reviewed the OARRS report for this patient. This report is scanned into the electronic medical record. I have considered the risks of abuse, dependence, addiction and diversion. It showed: Few Percocet 5 mg from different providers since 2022  OPIOID RISK ASSESSMENT SCORE    Aberrant behavior: none      Diagnostic studies:  2023 left shoulder x-ray was within normal limits  2023 x-ray of the lumbar spine showed L4-5 laminectomy with L3-S1 fusion with spinal cord stimulator  2020 MRI of the lumbar spine report showed satisfactory fusion L4-S1 just mild disease at L2-3:  FINDINGS:  Again, note is made of changes consistent with previous posterior  laminectomy at the L4-5 and L5-S1 levels. Bilateral pedicle screws  and rods are again seen extending from the L3 level through the S1  level.     L5-S1: Again, note is made of mild hypertrophic facet changes  bilaterally. There is no significant central canal or neural  foraminal stenosis.     L4-5: Again, note is made of mild hypertrophic facet changes  bilaterally without significant canal or foraminal stenosis.     L3-4: The findings are unremarkable.     L2-3: There is been interval development of a central disc herniation  superimposed upon diffuse disc bulge. On the current examination  there is mild-to-moderate central canal stenosis. The neural foramina  are patent.     L1-2: The findings are unremarkable.     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     Eyes: Negative.    Respiratory: Negative.     Cardiovascular: Negative.    Gastrointestinal: Negative.    Endocrine: Negative.    Genitourinary: Negative.    Musculoskeletal:  Positive for arthralgias, back pain and myalgias.   Skin: Negative.    Hematological: Negative.    Psychiatric/Behavioral: Negative.             Physical Exam  Vitals and nursing note reviewed.   Constitutional:       Appearance: Normal appearance.       HENT:      Head: Normocephalic and atraumatic.      Nose: Nose normal.   Eyes:      Extraocular Movements: Extraocular movements intact.      Conjunctiva/sclera: Conjunctivae normal.      Pupils: Pupils are equal, round, and reactive to light.   Cardiovascular:      Rate and Rhythm: Normal rate and regular rhythm.      Pulses: Normal pulses.      Heart sounds: Normal heart sounds.   Pulmonary:      Effort: Pulmonary effort is normal.      Breath sounds: Normal breath sounds.   Abdominal:      General: Abdomen is flat. Bowel sounds are normal.      Palpations: Abdomen is soft.   Musculoskeletal:         General: Tenderness and deformity present.   Skin:     General: Skin is warm.   Neurological:      General: No focal deficit present.      Mental Status: She is alert and oriented to person, place, and time.   Psychiatric:         Mood and Affect: Mood normal.         Behavior: Behavior normal.            Assessment  Pleasant 53 years old lady who has significant history of postlaminectomy syndrome of the lumbar spine in addition to T12 compression fracture treated with kyphoplasty.  The patient has fusion extent L3-S1 followed by [employer] Infinion lead spinal cord stimulator implant that helps with the lower extremity but does not affect the lower back pain.  The patient has significant left shoulder pain almost looks like CRPS with hyperalgesia and allodynia.  The patient was diagnosed with frozen shoulder.  She had shoulder injection without much benefit.  Will plan on ketorolac injection and will start on a neuro supplement in addition to IV infusion therapy.  Will start her on nortriptyline at night and the patient to take her Effexor in the morning.  Left shoulder suprascapular nerve block as soon as possible.  Will contact [employer] for spinal cord stimulator adjustment and  may be revision of her IPG in the future.           Plan  At least 50% of the visit was involved in the discussion of the options for treatment. We discussed exercises, medication, interventional therapies and surgery. Healthy life style is essential with patient hard work to achieve the wellness. In addition; discussion with the patient and/or family about any of the diagnostic results, impressions and/or recommended diagnostic studies, prognosis, risks and benefits of treatment options, instructions for treatment and/or follow-up, importance of compliance with chosen treatment options, risk-factor reduction, and patient/family education.         Pool therapy, walking in the pool, at least 3x per week for 30 minutes  Smoking cessation  Neuro supplement ordered  Ketorolac 60 mg IM today  Ketorolac 10 mg p.o. to follow  Nortriptyline 25 mg at bedtime may increase to 75 mg at bedtime patient takes Wellbutrin 150 mg every morning  IV infusion therapy once every 2 weeks  Contacted [employer] to adjust the spinal cord stimulator and possible revision of IPG pocket in the future  Healthy lifestyle and anti-inflammatory diet in addition to weight control discussed with the patient  Alternative chronic pain therapies was discussed, encouraged and information was handed  Return to Clinic 3 months       *Please note this report has been produced using speech recognition software and may contain errors related to that system including grammar, punctuation and spelling as well as words and phrases that may be inappropriate. If there are questions or concerns, please feel free to contact me to clarify.    Adis Villavicencio MD

## 2024-05-17 ENCOUNTER — TELEPHONE (OUTPATIENT)
Dept: PAIN MEDICINE | Facility: CLINIC | Age: 54
End: 2024-05-17

## 2024-05-17 ENCOUNTER — OFFICE VISIT (OUTPATIENT)
Dept: PAIN MEDICINE | Facility: CLINIC | Age: 54
End: 2024-05-17
Payer: COMMERCIAL

## 2024-05-17 VITALS
HEART RATE: 66 BPM | OXYGEN SATURATION: 97 % | SYSTOLIC BLOOD PRESSURE: 127 MMHG | TEMPERATURE: 97.9 F | DIASTOLIC BLOOD PRESSURE: 76 MMHG | RESPIRATION RATE: 16 BRPM

## 2024-05-17 DIAGNOSIS — M24.612 SHOULDER ANKYLOSIS, LEFT: Primary | ICD-10-CM

## 2024-05-17 DIAGNOSIS — M79.7 FIBROMYALGIA: ICD-10-CM

## 2024-05-17 DIAGNOSIS — M96.1 POSTLAMINECTOMY SYNDROME OF LUMBAR REGION: ICD-10-CM

## 2024-05-17 PROCEDURE — 99204 OFFICE O/P NEW MOD 45 MIN: CPT | Performed by: ANESTHESIOLOGY

## 2024-05-17 PROCEDURE — 96372 THER/PROPH/DIAG INJ SC/IM: CPT | Performed by: ANESTHESIOLOGY

## 2024-05-17 PROCEDURE — 99214 OFFICE O/P EST MOD 30 MIN: CPT | Performed by: ANESTHESIOLOGY

## 2024-05-17 PROCEDURE — 2500000004 HC RX 250 GENERAL PHARMACY W/ HCPCS (ALT 636 FOR OP/ED): Performed by: ANESTHESIOLOGY

## 2024-05-17 RX ORDER — ACETAMINOPHEN 160 MG/5ML
200 SUSPENSION, ORAL (FINAL DOSE FORM) ORAL 3 TIMES DAILY
Qty: 90 CAPSULE | Refills: 11 | Status: SHIPPED | OUTPATIENT
Start: 2024-05-17 | End: 2025-05-17

## 2024-05-17 RX ORDER — ALBUTEROL SULFATE 0.83 MG/ML
3 SOLUTION RESPIRATORY (INHALATION) AS NEEDED
OUTPATIENT
Start: 2024-05-17

## 2024-05-17 RX ORDER — PERPHENAZINE 16 MG
TABLET ORAL
Qty: 180 CAPSULE | Refills: 11 | Status: SHIPPED | OUTPATIENT
Start: 2024-05-17

## 2024-05-17 RX ORDER — FAMOTIDINE 10 MG/ML
20 INJECTION INTRAVENOUS ONCE AS NEEDED
OUTPATIENT
Start: 2024-05-17

## 2024-05-17 RX ORDER — NORTRIPTYLINE HYDROCHLORIDE 25 MG/1
CAPSULE ORAL
Qty: 90 CAPSULE | Refills: 11 | Status: SHIPPED | OUTPATIENT
Start: 2024-05-17

## 2024-05-17 RX ORDER — KETOROLAC TROMETHAMINE 30 MG/ML
60 INJECTION, SOLUTION INTRAMUSCULAR; INTRAVENOUS ONCE
Status: COMPLETED | OUTPATIENT
Start: 2024-05-17 | End: 2024-05-17

## 2024-05-17 RX ORDER — VITAMIN B COMPLEX
1 CAPSULE ORAL 2 TIMES DAILY
Qty: 60 CAPSULE | Refills: 11 | Status: SHIPPED | OUTPATIENT
Start: 2024-05-17 | End: 2025-05-17

## 2024-05-17 RX ORDER — KETOROLAC TROMETHAMINE 30 MG/ML
30 INJECTION, SOLUTION INTRAMUSCULAR; INTRAVENOUS ONCE
OUTPATIENT
Start: 2024-05-17 | End: 2024-05-17

## 2024-05-17 RX ORDER — KETOROLAC TROMETHAMINE 10 MG/1
10 TABLET, FILM COATED ORAL EVERY 6 HOURS PRN
Qty: 20 TABLET | Refills: 0 | Status: SHIPPED | OUTPATIENT
Start: 2024-05-17 | End: 2024-05-22

## 2024-05-17 RX ORDER — DIPHENHYDRAMINE HYDROCHLORIDE 50 MG/ML
50 INJECTION INTRAMUSCULAR; INTRAVENOUS AS NEEDED
OUTPATIENT
Start: 2024-05-17

## 2024-05-17 RX ORDER — EPINEPHRINE 0.3 MG/.3ML
0.3 INJECTION SUBCUTANEOUS EVERY 5 MIN PRN
OUTPATIENT
Start: 2024-05-17

## 2024-05-17 RX ADMIN — KETOROLAC TROMETHAMINE 60 MG: 30 INJECTION, SOLUTION INTRAMUSCULAR at 11:02

## 2024-05-17 SDOH — ECONOMIC STABILITY: FOOD INSECURITY: WITHIN THE PAST 12 MONTHS, YOU WORRIED THAT YOUR FOOD WOULD RUN OUT BEFORE YOU GOT MONEY TO BUY MORE.: NEVER TRUE

## 2024-05-17 SDOH — ECONOMIC STABILITY: FOOD INSECURITY: WITHIN THE PAST 12 MONTHS, THE FOOD YOU BOUGHT JUST DIDN'T LAST AND YOU DIDN'T HAVE MONEY TO GET MORE.: NEVER TRUE

## 2024-05-17 ASSESSMENT — ENCOUNTER SYMPTOMS
HEMATOLOGIC/LYMPHATIC NEGATIVE: 1
ARTHRALGIAS: 1
OCCASIONAL FEELINGS OF UNSTEADINESS: 0
RESPIRATORY NEGATIVE: 1
PSYCHIATRIC NEGATIVE: 1
DEPRESSION: 0
ENDOCRINE NEGATIVE: 1
MYALGIAS: 1
BACK PAIN: 1
CARDIOVASCULAR NEGATIVE: 1
LOSS OF SENSATION IN FEET: 0
EYES NEGATIVE: 1
GASTROINTESTINAL NEGATIVE: 1

## 2024-05-17 ASSESSMENT — PAIN DESCRIPTION - LOCATION: LOCATION: SHOULDER

## 2024-05-17 ASSESSMENT — PATIENT HEALTH QUESTIONNAIRE - PHQ9
2. FEELING DOWN, DEPRESSED OR HOPELESS: SEVERAL DAYS
10. IF YOU CHECKED OFF ANY PROBLEMS, HOW DIFFICULT HAVE THESE PROBLEMS MADE IT FOR YOU TO DO YOUR WORK, TAKE CARE OF THINGS AT HOME, OR GET ALONG WITH OTHER PEOPLE: VERY DIFFICULT
SUM OF ALL RESPONSES TO PHQ9 QUESTIONS 1 AND 2: 2
1. LITTLE INTEREST OR PLEASURE IN DOING THINGS: SEVERAL DAYS

## 2024-05-17 ASSESSMENT — PAIN - FUNCTIONAL ASSESSMENT: PAIN_FUNCTIONAL_ASSESSMENT: 0-10

## 2024-05-17 ASSESSMENT — LIFESTYLE VARIABLES
AUDIT-C TOTAL SCORE: 2
SKIP TO QUESTIONS 9-10: 0
HOW OFTEN DO YOU HAVE SIX OR MORE DRINKS ON ONE OCCASION: LESS THAN MONTHLY
HOW MANY STANDARD DRINKS CONTAINING ALCOHOL DO YOU HAVE ON A TYPICAL DAY: 1 OR 2
TOTAL SCORE: 13
HOW OFTEN DO YOU HAVE A DRINK CONTAINING ALCOHOL: MONTHLY OR LESS

## 2024-05-17 ASSESSMENT — COLUMBIA-SUICIDE SEVERITY RATING SCALE - C-SSRS
1. IN THE PAST MONTH, HAVE YOU WISHED YOU WERE DEAD OR WISHED YOU COULD GO TO SLEEP AND NOT WAKE UP?: NO
6. HAVE YOU EVER DONE ANYTHING, STARTED TO DO ANYTHING, OR PREPARED TO DO ANYTHING TO END YOUR LIFE?: NO
2. HAVE YOU ACTUALLY HAD ANY THOUGHTS OF KILLING YOURSELF?: NO

## 2024-05-17 ASSESSMENT — PAIN SCALES - GENERAL
PAINLEVEL: 8
PAINLEVEL_OUTOF10: 8

## 2024-05-17 ASSESSMENT — PAIN DESCRIPTION - ORIENTATION: ORIENTATION: LEFT

## 2024-05-17 NOTE — LETTER
May 17, 2024     Boogie Herrera MD PhD  [id number] [facility] [id number]    Patient: Catherine Cano   YOB: 1970   Date of Visit: 2024       Dear Dr. Boogie Herrera MD PhD:    Thank you for referring Catherine Cano to me for evaluation. Below are my notes for this consultation.  If you have questions, please do not hesitate to call me. I look forward to following your patient along with you.       Sincerely,     Adis Villavicencio MD      CC: No Recipients  ______________________________________________________________________________________    No chief complaint on file.    History of Present Complaint:  The patient was referred to us by Referring Provider: primary care dr Herrera. this is 53 y.o.  female  Not accompanied by another person with a past history of anxiety, stimulator to left butt cheek, cage in back, gallbladder removal, hysterectomy. presenting with  left shoulder pain.    Pain started 1 year ago due to  patient says dog used to jump on her shoulders, and that was when the pain started.,  Pain is Worse moving shoulder at all.   Patient history significant for the following red flags: Patient has no significant history of red flags  The pain is described as sharp and is relieved by Medications nortiptaline, and toradol helped in the past      Prior Pain Therapies: medications and pain patches, cortisone shots  Past surgical history:  Lumbar laminectomy gallbladder, thyroidectomy    Past medical history: anxiety, depression, migraine, chronic low back pain, chronic insomnia    Work Hx/litigation:  Was working at a [email or web address] center, until last week but is also on disability.   Social history: , has two children one is . Current every day smoker, drinks occasionally, denies drug use.    Diagnostic studies:  x rays    History of Present Complaint:  The patient was referred to us by Referring Provider: Boogie Herrera MD PhD for lower back pain and bilateral sciatic. this is 53 y.o.   female with a past history of anxiety, depression, Hypothyroidism, and smoker  patient had spinal cord stimulator implanted for postlaminectomy syndrome with L3-S1 fusion by Mike Nelson on 12/1/2020 and received bilateral L2-3 TFESI by Kelley Mary without good benefit presenting with lower back pain in addition to intractable left shoulder pain after her dog jumped on her shoulder.  The patient has a history of chronic pain with compression fracture at T12 with kyphoplasty done there which still giving her problems with her lower back.  The patient had fusion of her lumbar spine that continued to have problem with the lower back and lower extremity pain.  She had spinal cord stimulator [employer] implanted with Infinion leads with the leads in the middle of T7 in good position but the stimulation is not covering the lower back and it works good for her lower extremities and the IPG is uncomfortable in her gluteal area underneath the skin.  The patient has no incontinence no saddle paresthesia and no paralysis.  The patient has no red flags      Procedures:   2022 [employer] Infinion leads spinal cord stimulator implant  2/11/2022 bilateral L2 TFESI by Kelley Mary the patient has had a 0% improvement in pain.    Portions of record reviewed for pertinent issues: active problem list, medication list, allergies, family history, social history, notes from last encounter, encounters, lab results, imaging and other available records.    I have personally reviewed the OARRS report for this patient. This report is scanned into the electronic medical record. I have considered the risks of abuse, dependence, addiction and diversion. It showed: Few Percocet 5 mg from different providers since 2022  OPIOID RISK ASSESSMENT SCORE 5/26  Aberrant behavior: none      Diagnostic studies:  11/1/2023 left shoulder x-ray was within normal limits  8/16/2023 x-ray of the lumbar spine showed L4-5 laminectomy with  L3-S1 fusion with spinal cord stimulator  2020 MRI of the lumbar spine report showed satisfactory fusion L4-S1 just mild disease at L2-3:  FINDINGS:  Again, note is made of changes consistent with previous posterior  laminectomy at the L4-5 and L5-S1 levels. Bilateral pedicle screws  and rods are again seen extending from the L3 level through the S1  level.     L5-S1: Again, note is made of mild hypertrophic facet changes  bilaterally. There is no significant central canal or neural  foraminal stenosis.     L4-5: Again, note is made of mild hypertrophic facet changes  bilaterally without significant canal or foraminal stenosis.     L3-4: The findings are unremarkable.     L2-3: There is been interval development of a central disc herniation  superimposed upon diffuse disc bulge. On the current examination  there is mild-to-moderate central canal stenosis. The neural foramina  are patent.     L1-2: The findings are unremarkable.     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     Eyes: Negative.    Respiratory: Negative.     Cardiovascular: Negative.    Gastrointestinal: Negative.    Endocrine: Negative.    Genitourinary: Negative.    Musculoskeletal:  Positive for arthralgias, back pain and myalgias.   Skin: Negative.    Hematological: Negative.    Psychiatric/Behavioral: Negative.            Physical Exam  Vitals and nursing note reviewed.   Constitutional:       Appearance: Normal appearance.       HENT:      Head: Normocephalic and atraumatic.       Nose: Nose normal.   Eyes:      Extraocular Movements: Extraocular movements intact.      Conjunctiva/sclera: Conjunctivae normal.      Pupils: Pupils are equal, round, and reactive to light.   Cardiovascular:      Rate and Rhythm: Normal rate and regular rhythm.      Pulses: Normal pulses.      Heart sounds: Normal heart sounds.   Pulmonary:      Effort: Pulmonary effort is normal.      Breath sounds: Normal breath sounds.   Abdominal:      General: Abdomen is flat. Bowel sounds are normal.      Palpations: Abdomen is soft.   Musculoskeletal:         General: Tenderness and deformity present.   Skin:     General: Skin is warm.   Neurological:      General: No focal deficit present.      Mental Status: She is alert and oriented to person, place, and time.   Psychiatric:         Mood and Affect: Mood normal.         Behavior: Behavior normal.            Assessment  Pleasant 53 years old lady who has significant history of postlaminectomy syndrome of the lumbar spine in addition to T12 compression fracture treated with kyphoplasty.  The patient has fusion extent L3-S1 followed by [employer] Infinion lead spinal cord stimulator implant that helps with the lower extremity but does not affect the lower back pain.  The patient has significant left shoulder pain almost looks like CRPS with hyperalgesia and allodynia.  The patient was diagnosed with frozen shoulder.  She had shoulder injection without much benefit.  Will plan on ketorolac injection and will start on a neuro supplement in addition to IV infusion therapy.  Will start her on nortriptyline at night and the patient to take her Effexor in the morning.  Left shoulder suprascapular nerve block as soon as possible.  Will contact [employer] for spinal cord stimulator adjustment and may be revision of her IPG in the future.           Plan  At least 50% of the visit was involved in the discussion of the options for treatment. We discussed exercises,  medication, interventional therapies and surgery. Healthy life style is essential with patient hard work to achieve the wellness. In addition; discussion with the patient and/or family about any of the diagnostic results, impressions and/or recommended diagnostic studies, prognosis, risks and benefits of treatment options, instructions for treatment and/or follow-up, importance of compliance with chosen treatment options, risk-factor reduction, and patient/family education.         Pool therapy, walking in the pool, at least 3x per week for 30 minutes  Smoking cessation  Neuro supplement ordered  Ketorolac 60 mg IM today  Ketorolac 10 mg p.o. to follow  Nortriptyline 25 mg at bedtime may increase to 75 mg at bedtime patient takes Wellbutrin 150 mg every morning  IV infusion therapy once every 2 weeks  Contacted [employer] to adjust the spinal cord stimulator and possible revision of IPG pocket in the future  Healthy lifestyle and anti-inflammatory diet in addition to weight control discussed with the patient  Alternative chronic pain therapies was discussed, encouraged and information was handed  Return to Clinic 3 months       *Please note this report has been produced using speech recognition software and may contain errors related to that system including grammar, punctuation and spelling as well as words and phrases that may be inappropriate. If there are questions or concerns, please feel free to contact me to clarify.    Adis Villavicencio MD

## 2024-05-17 NOTE — PROGRESS NOTES
No chief complaint on file.    History of Present Complaint:  The patient was referred to us by Referring Provider: primary care dr Herrera. this is 53 y.o.  female  Not accompanied by another person with a past history of anxiety, stimulator to left butt cheek, cage in back, gallbladder removal, hysterectomy. presenting with  left shoulder pain.    Pain started 1 year ago due to  patient says dog used to jump on her shoulders, and that was when the pain started.,  Pain is Worse moving shoulder at all.   Patient history significant for the following red flags: Patient has no significant history of red flags  The pain is described as sharp and is relieved by Medications nortiptaline, and toradol helped in the past      Prior Pain Therapies: medications and pain patches, cortisone shots  Past surgical history:  Lumbar laminectomy gallbladder, thyroidectomy    Past medical history: anxiety, depression, migraine, chronic low back pain, chronic insomnia    Work Hx/litigation:  Was working at a [employer], until last week but is also on disability.   Social history: , has two children one is . Current every day smoker, drinks occasionally, denies drug use.    Diagnostic studies:  x rays

## 2024-05-17 NOTE — TELEPHONE ENCOUNTER
Pt has questions about the supplements that were sent in and not the toradol and nortriptyline. Wants a call back

## 2024-05-21 NOTE — TELEPHONE ENCOUNTER
Called patient no answer . Left a detaild message to please call back or write me a message via my chart on her questions or concerns .

## 2024-06-10 ENCOUNTER — HOSPITAL ENCOUNTER (OUTPATIENT)
Dept: PAIN MEDICINE | Facility: CLINIC | Age: 54
Discharge: HOME | End: 2024-06-10
Payer: COMMERCIAL

## 2024-06-10 ENCOUNTER — HOSPITAL ENCOUNTER (OUTPATIENT)
Dept: RADIOLOGY | Facility: CLINIC | Age: 54
Discharge: HOME | End: 2024-06-10
Payer: COMMERCIAL

## 2024-06-10 VITALS
DIASTOLIC BLOOD PRESSURE: 82 MMHG | BODY MASS INDEX: 29.99 KG/M2 | HEIGHT: 65 IN | TEMPERATURE: 97.2 F | RESPIRATION RATE: 18 BRPM | OXYGEN SATURATION: 96 % | WEIGHT: 180 LBS | HEART RATE: 79 BPM | SYSTOLIC BLOOD PRESSURE: 118 MMHG

## 2024-06-10 DIAGNOSIS — M79.7 FIBROMYALGIA: ICD-10-CM

## 2024-06-10 DIAGNOSIS — M96.1 POSTLAMINECTOMY SYNDROME OF LUMBAR REGION: ICD-10-CM

## 2024-06-10 DIAGNOSIS — M24.612 SHOULDER ANKYLOSIS, LEFT: ICD-10-CM

## 2024-06-10 DIAGNOSIS — M54.50 CHRONIC BILATERAL LOW BACK PAIN WITHOUT SCIATICA: ICD-10-CM

## 2024-06-10 DIAGNOSIS — G89.29 CHRONIC BILATERAL LOW BACK PAIN WITHOUT SCIATICA: ICD-10-CM

## 2024-06-10 PROCEDURE — 2500000005 HC RX 250 GENERAL PHARMACY W/O HCPCS

## 2024-06-10 PROCEDURE — 7100000009 HC PHASE TWO TIME - INITIAL BASE CHARGE

## 2024-06-10 PROCEDURE — 64418 NJX AA&/STRD SPRSCAP NRV: CPT | Performed by: ANESTHESIOLOGY

## 2024-06-10 PROCEDURE — 77002 NEEDLE LOCALIZATION BY XRAY: CPT | Performed by: ANESTHESIOLOGY

## 2024-06-10 PROCEDURE — 7100000010 HC PHASE TWO TIME - EACH INCREMENTAL 1 MINUTE

## 2024-06-10 PROCEDURE — 2500000004 HC RX 250 GENERAL PHARMACY W/ HCPCS (ALT 636 FOR OP/ED)

## 2024-06-10 RX ORDER — LIDOCAINE HYDROCHLORIDE 10 MG/ML
INJECTION, SOLUTION EPIDURAL; INFILTRATION; INTRACAUDAL; PERINEURAL
Status: COMPLETED
Start: 2024-06-10 | End: 2024-06-10

## 2024-06-10 RX ORDER — BUPIVACAINE HYDROCHLORIDE 7.5 MG/ML
INJECTION, SOLUTION EPIDURAL; RETROBULBAR
Status: COMPLETED
Start: 2024-06-10 | End: 2024-06-10

## 2024-06-10 RX ORDER — TRIAMCINOLONE ACETONIDE 40 MG/ML
INJECTION, SUSPENSION INTRA-ARTICULAR; INTRAMUSCULAR
Status: COMPLETED
Start: 2024-06-10 | End: 2024-06-10

## 2024-06-10 RX ORDER — KETOROLAC TROMETHAMINE 10 MG/1
10 TABLET, FILM COATED ORAL 3 TIMES DAILY
Qty: 15 TABLET | Refills: 0 | Status: SHIPPED | OUTPATIENT
Start: 2024-06-10

## 2024-06-10 RX ADMIN — TRIAMCINOLONE ACETONIDE 40 MG: 40 INJECTION, SUSPENSION INTRA-ARTICULAR; INTRAMUSCULAR at 08:34

## 2024-06-10 RX ADMIN — BUPIVACAINE HYDROCHLORIDE 75 MG: 7.5 INJECTION, SOLUTION EPIDURAL; RETROBULBAR at 08:34

## 2024-06-10 RX ADMIN — LIDOCAINE HYDROCHLORIDE 300 MG: 10 INJECTION, SOLUTION EPIDURAL; INFILTRATION; INTRACAUDAL; PERINEURAL at 08:33

## 2024-06-10 ASSESSMENT — COLUMBIA-SUICIDE SEVERITY RATING SCALE - C-SSRS

## 2024-06-10 ASSESSMENT — ENCOUNTER SYMPTOMS
OCCASIONAL FEELINGS OF UNSTEADINESS: 0
LOSS OF SENSATION IN FEET: 0
DEPRESSION: 0

## 2024-06-10 ASSESSMENT — PAIN SCALES - GENERAL
PAINLEVEL_OUTOF10: 9
PAINLEVEL_OUTOF10: 9

## 2024-06-10 ASSESSMENT — PAIN - FUNCTIONAL ASSESSMENT: PAIN_FUNCTIONAL_ASSESSMENT: 0-10

## 2024-06-10 NOTE — Clinical Note
Patient in room , Patient identified . Universal protocal , and preprocedure check list ,patient positioned prone  , site prepped left shoulder .

## 2024-06-10 NOTE — DISCHARGE INSTRUCTIONS
PREOPERATIVE DIAGNOSIS:  left chronic shoulder pain    POST OPERATIVE DIAGNOSIS:  Same    PROCEDURE: left Suprascapular Nerve Block under fluoroscopy guidance     COMPLICATIONS: None.     ESTIMATED BLOOD LOSS: None.     INDICATIONS:  This is a 57 year old female with a significant history of left  shoulder pain.  Physical exam and diagnostic studies correlates with the pre-operative diagnoses.  Because of these findings we have elected to proceed with a suprascapular nerve block at left  levels.      DESCRIPTION OF PROCEDURE: After sufficient consent was signed, the patient was brought to the Operating Room and placed in a prone position with a pillow underneath the chest. The  left Side was prepped and draped in the usual fashion.     Under fluoroscopic guidance the scapula then the scapular notch were identified. The skin at the entry site was infiltrated with lidocaine 0.5% with bicarb using size 25-gauge needle. A size 22-gauge 4 inch stimuplex needle was advanced gradually until the needle tip was placed at the suprascapular notch with positive Stim at 0.3 MA. Omni 240, 3cc were injected showed good distribution of the dye in the groove without intravascular signs. Bupivacaine 0.75%, 8 cc, + Kenalog 40 mg was injected and the needle was removed.        The patient tolerated the procedure very well and was transferred to the Recovery Room in stable condition.  The patient had excellent resolution of symptoms.  Patient to follow-up in the Pain Management Clinic as scheduled.

## 2024-07-11 DIAGNOSIS — G89.29 CHRONIC LEFT SHOULDER PAIN: ICD-10-CM

## 2024-07-11 DIAGNOSIS — M25.512 CHRONIC LEFT SHOULDER PAIN: ICD-10-CM

## 2024-07-11 DIAGNOSIS — F51.04 CHRONIC INSOMNIA: ICD-10-CM

## 2024-07-11 DIAGNOSIS — M54.31 SCIATICA OF RIGHT SIDE: ICD-10-CM

## 2024-07-11 RX ORDER — TRAZODONE HYDROCHLORIDE 50 MG/1
50 TABLET ORAL NIGHTLY
Qty: 30 TABLET | Refills: 0 | Status: SHIPPED | OUTPATIENT
Start: 2024-07-11

## 2024-07-11 RX ORDER — KETOROLAC TROMETHAMINE 10 MG/1
10 TABLET, FILM COATED ORAL EVERY 8 HOURS PRN
Qty: 15 TABLET | Refills: 0 | Status: SHIPPED | OUTPATIENT
Start: 2024-07-11

## 2024-07-11 NOTE — TELEPHONE ENCOUNTER
GFR 59    Orders Placed This Encounter      ketorolac (Toradol) 10 mg tablet      traZODone (Desyrel) 50 mg tablet

## 2024-07-23 NOTE — PROGRESS NOTES
SUBJECTIVE:  This is 53 y.o.  female with PMH of anxiety, depression, Hypothyroidism, and smoker  patient had spinal cord stimulator implanted for postlaminectomy syndrome with L3-S1 fusion by Mike Nelson on 12/1/2020 and received bilateral L2-3 TFESI by Kelley Mary without good benefit.  The spinal cord stimulator covers her lower extremity but not the lower back pain despite the fact the leads are in perfect position in the middle of T7 extending to T10.  Patient had a history of T12 compression fracture with kyphoplasty done on that level and continued pain.  The patient had shoulder pain and received suprascapular nerve block who is here for follow-up ***    Evaluate the result of the left suprascapular nerve block and consider IV infusion she already has a spinal cord stimulator had multiple surgeries failed TFESI's    Prior office visit:  5/17/2024: The patient was referred to us by Referring Provider: Boogie Herrera MD PhD for lower back pain and bilateral sciatic. this is 53 y.o.  female with a past history of anxiety, depression, Hypothyroidism, and smoker  patient had spinal cord stimulator implanted for postlaminectomy syndrome with L3-S1 fusion by Mike Nelson on 12/1/2020 and received bilateral L2-3 TFESI by Kelley Mary without good benefit presenting with lower back pain in addition to intractable left shoulder pain after her dog jumped on her shoulder.  The patient has a history of chronic pain with compression fracture at T12 with kyphoplasty done there which still giving her problems with her lower back.  The patient had fusion of her lumbar spine that continued to have problem with the lower back and lower extremity pain.  She had spinal cord stimulator [place] implanted with Infinion leads with the leads in the middle of T7 in good position but the stimulation is not covering the lower back and it works good for her lower extremities and the IPG is uncomfortable in her  gluteal area underneath the skin.  The patient has no incontinence no saddle paresthesia and no paralysis.  The patient has no red flags        Procedures:   6/10/2024 left SSNB patient has had ***% improvement in pain and function  2022 [employer] Infinion leads spinal cord stimulator implant helps with the legs but not the back  2/11/2022 bilateral L2 TFESI by Kelley Mary the patient has had a 0% improvement in pain.     Portions of record reviewed for pertinent issues: active problem list, medication list, allergies, family history, social history, notes from last encounter, encounters, lab results, imaging and other available records.     I have personally reviewed the OARRS report for this patient. This report is scanned into the electronic medical record. I have considered the risks of abuse, dependence, addiction and diversion. It showed: Few Percocet 5 mg from different providers since 2022  OPIOID RISK ASSESSMENT SCORE 5/26  Aberrant behavior: none        Diagnostic studies:  11/1/2023 left shoulder x-ray was within normal limits  8/16/2023 x-ray of the lumbar spine showed L4-5 laminectomy with L3-S1 fusion with spinal cord stimulator    11/13/2020 MRI of the lumbar spine report showed satisfactory L4-S1 fusion just mild disease at L2-3:  FINDINGS:  Again, note is made of changes consistent with previous posterior  laminectomy at the L4-5 and L5-S1 levels. Bilateral pedicle screws  and rods are again seen extending from the L3 level through the S1  level.     L5-S1: Again, note is made of mild hypertrophic facet changes  bilaterally. There is no significant central canal or neural  foraminal stenosis.     L4-5: Again, note is made of mild hypertrophic facet changes  bilaterally without significant canal or foraminal stenosis.     L3-4: The findings are unremarkable.     L2-3: There is been interval development of a central disc herniation  superimposed upon diffuse disc bulge. On the current  examination  there is mild-to-moderate central canal stenosis. The neural foramina  are patent.     L1-2: The findings are unremarkable.     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      I have personally reviewed the OARRS report for this patient. This report is scanned into the electronic medical record. I have considered the risks of abuse, dependence, addiction and diversion. It showed: ***  OPIOID RISK ASSESSMENT SCORE ***/26  Opioid agreement: ***  Activities of daily living: ***  Adverse effects: ***  Analgesia: W/O: ***/10, W ***/10    Toxicology screen: ***  Aberrant behavior: ***        Review of Systems     Physical Exam                 Plan  At least 50% of the visit was involved in the discussion of the options for treatment. We discussed exercises, medication, interventional therapies and surgery. Healthy life style is essential with patient hard work to achieve the wellness. In addition; discussion with the patient and/or family about any of the diagnostic results, impressions and/or recommended diagnostic studies, prognosis, risks and benefits of treatment options, instructions for treatment and/or follow-up, importance of compliance with chosen treatment options,  risk-factor reduction, and patient/family education.         Pool therapy, walking in the pool, at least 3x per week for 30 minutes  Continue self-directed physical therapy  *** Smoking cessation  Healthy lifestyle and anti-inflammatory diet in addition to weight control discussed with the patient  Alternative chronic pain therapies was discussed, encouraged and information was handed  Return to Clinic ***     *Please note this report has been produced using speech recognition software and may contain errors related to that system including grammar, punctuation and spelling as well as words and phrases that may be inappropriate. If there are questions or concerns, please feel free to contact me to clarify.    Adis Villavicencio MD

## 2024-07-24 DIAGNOSIS — M79.7 FIBROMYALGIA: Primary | ICD-10-CM

## 2024-07-24 RX ORDER — KETOROLAC TROMETHAMINE 30 MG/ML
30 INJECTION, SOLUTION INTRAMUSCULAR; INTRAVENOUS ONCE
Status: CANCELLED | OUTPATIENT
Start: 2024-07-24 | End: 2024-07-24

## 2024-07-24 RX ORDER — KETAMINE HCL IN NACL, ISO-OSM 100MG/10ML
SYRINGE (ML) INJECTION ONCE
Status: CANCELLED | OUTPATIENT
Start: 2024-07-29

## 2024-07-25 ENCOUNTER — APPOINTMENT (OUTPATIENT)
Dept: PAIN MEDICINE | Facility: CLINIC | Age: 54
End: 2024-07-25
Payer: COMMERCIAL

## 2024-07-25 NOTE — PROGRESS NOTES
Virtual or Telephone Consent    An interactive audio and video telecommunication system which permits real time communications between the patient (at the originating site) and provider (at the distant site) was utilized to provide this telehealth service.   Verbal consent was requested and obtained from Catherine Cano on this date, 07/25/24 for a telehealth visit.       SUBJECTIVE:  This is 53 y.o.  female with PMH of anxiety, depression, Hypothyroidism, cocaine abuse, and smoker  patient had spinal cord stimulator implanted for postlaminectomy syndrome with L3-S1 fusion by Mike Nelson on 12/1/2020 and received bilateral L2-3 TFESI by Kelley Mary without good benefit patient also has kyphoplasty of T12 and continues to have Rhiew lower back pain.  She received [employer] spinal cord stimulator implant with Infinion leads By Erick Burton with the top of the leads at middle of T7 which helps with the legs pain but does not help with the back pain.  The patient is here with a new referral for shoulder pain that was treated with left SSNB on 6/10/2024 who is here for follow-up stated that the injection itself was painful but she had 70% relief of her pain in her shoulder and she is very happy with that.  She is looking forward for her opioid sparing infusion and I explained again further what is the infusion entitles.  The patient is going on vacation in August 1 to [place] where her brother-in-law lives there for 10 days and she is taking her ketorolac 10 mg with her I recommended that she takes it with acetaminophen at 1000 mg when she needs it.      Prior office visit:  5/17/2024: The patient was referred to us by Referring Provider: Boogie Herrera MD PhD for lower back pain and bilateral sciatic. this is 53 y.o.  female with a past history of anxiety, depression, Hypothyroidism, and smoker  patient had spinal cord stimulator implanted for postlaminectomy syndrome with L3-S1 fusion by Mike  Omar on 12/1/2020 and received bilateral L2-3 TFESI by Kelley Mary without good benefit presenting with lower back pain in addition to intractable left shoulder pain after her dog jumped on her shoulder.  The patient has a history of chronic pain with compression fracture at T12 with kyphoplasty done there which still giving her problems with her lower back.  The patient had fusion of her lumbar spine that continued to have problem with the lower back and lower extremity pain.  She had spinal cord stimulator [employer] implanted with Infinion leads with the leads in the middle of T7 in good position but the stimulation is not covering the lower back and it works good for her lower extremities and the IPG is uncomfortable in her gluteal area underneath the skin.  The patient has no incontinence no saddle paresthesia and no paralysis.  The patient has no red flags        Procedures:   Opioid sparing infusion will be starting soon  6/10/2024 left SSNB the patient has had a 70% improvement in pain and function  2022 [employer] Infinion leads spinal cord stimulator implant by Erick Burton  2/11/2022 bilateral L2 TFESI by Kelley Mary the patient has had a 0% improvement in pain.     Portions of record reviewed for pertinent issues: active problem list, medication list, allergies, family history, social history, notes from last encounter, encounters, lab results, imaging and other available records.     I have personally reviewed the OARRS report for this patient. This report is scanned into the electronic medical record. I have considered the risks of abuse, dependence, addiction and diversion. It showed: Few Percocet 5 mg from different providers since 2022  OPIOID RISK ASSESSMENT SCORE 5/26  Aberrant behavior: none        Diagnostic studies:  11/1/2023 left shoulder x-ray was within normal limits  8/16/2023 x-ray of the lumbar spine showed L4-5 laminectomy with L3-S1 fusion with spinal cord  stimulator  2020 MRI of the lumbar spine report showed satisfactory fusion L4-S1 just mild disease at L2-3:  FINDINGS:  Again, note is made of changes consistent with previous posterior  laminectomy at the L4-5 and L5-S1 levels. Bilateral pedicle screws  and rods are again seen extending from the L3 level through the S1  level.     L5-S1: Again, note is made of mild hypertrophic facet changes  bilaterally. There is no significant central canal or neural  foraminal stenosis.     L4-5: Again, note is made of mild hypertrophic facet changes  bilaterally without significant canal or foraminal stenosis.     L3-4: The findings are unremarkable.     L2-3: There is been interval development of a central disc herniation  superimposed upon diffuse disc bulge. On the current examination  there is mild-to-moderate central canal stenosis. The neural foramina  are patent.     L1-2: The findings are unremarkable.     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     Eyes: Negative.    Respiratory: Negative.     Cardiovascular: Negative.    Gastrointestinal: Negative.    Endocrine: Negative.    Genitourinary: Negative.    Musculoskeletal:  Positive for arthralgias and myalgias.   Skin: Negative.    Neurological: Negative.    Hematological: Negative.    Psychiatric/Behavioral: Negative.          Physical Exam  Constitutional:       Appearance: Normal appearance.   Neurological:      General: No focal deficit present.      Mental Status: She is alert and oriented to  person, place, and time.   Psychiatric:         Mood and Affect: Mood normal.         Behavior: Behavior normal.                      Plan  At least 50% of the visit was involved in the discussion of the options for treatment. We discussed exercises, medication, interventional therapies and surgery. Healthy life style is essential with patient hard work to achieve the wellness. In addition; discussion with the patient and/or family about any of the diagnostic results, impressions and/or recommended diagnostic studies, prognosis, risks and benefits of treatment options, instructions for treatment and/or follow-up, importance of compliance with chosen treatment options, risk-factor reduction, and patient/family education.         Pool therapy, walking in the pool, at least 3x per week for 30 minutes  Continue self-directed physical therapy  Repeat left shoulder SSNB when patient call order placed  Start the IV infusion therapy soon  Healthy lifestyle and anti-inflammatory diet in addition to weight control discussed with the patient  Alternative chronic pain therapies was discussed, encouraged and information was handed  Return to Clinic 3 months     *Please note this report has been produced using speech recognition software and may contain errors related to that system including grammar, punctuation and spelling as well as words and phrases that may be inappropriate. If there are questions or concerns, please feel free to contact me to clarify.    Adis Villavicencio MD

## 2024-07-26 ENCOUNTER — TELEMEDICINE (OUTPATIENT)
Dept: PAIN MEDICINE | Facility: CLINIC | Age: 54
End: 2024-07-26
Payer: COMMERCIAL

## 2024-07-26 DIAGNOSIS — M79.7 FIBROMYALGIA: Primary | ICD-10-CM

## 2024-07-26 DIAGNOSIS — M24.612 ANKYLOSIS OF LEFT SHOULDER: ICD-10-CM

## 2024-07-26 PROCEDURE — 99214 OFFICE O/P EST MOD 30 MIN: CPT | Performed by: ANESTHESIOLOGY

## 2024-07-26 ASSESSMENT — ENCOUNTER SYMPTOMS
RESPIRATORY NEGATIVE: 1
ENDOCRINE NEGATIVE: 1
ARTHRALGIAS: 1
EYES NEGATIVE: 1
CARDIOVASCULAR NEGATIVE: 1
MYALGIAS: 1
PSYCHIATRIC NEGATIVE: 1
HEMATOLOGIC/LYMPHATIC NEGATIVE: 1
GASTROINTESTINAL NEGATIVE: 1
NEUROLOGICAL NEGATIVE: 1

## 2024-07-29 ENCOUNTER — INFUSION (OUTPATIENT)
Dept: INFUSION THERAPY | Facility: CLINIC | Age: 54
End: 2024-07-29
Payer: COMMERCIAL

## 2024-07-29 VITALS
DIASTOLIC BLOOD PRESSURE: 83 MMHG | OXYGEN SATURATION: 99 % | SYSTOLIC BLOOD PRESSURE: 138 MMHG | RESPIRATION RATE: 20 BRPM | TEMPERATURE: 96.6 F | HEART RATE: 77 BPM

## 2024-07-29 DIAGNOSIS — G89.29 CHRONIC BILATERAL LOW BACK PAIN WITHOUT SCIATICA: ICD-10-CM

## 2024-07-29 DIAGNOSIS — M54.31 SCIATICA OF RIGHT SIDE: ICD-10-CM

## 2024-07-29 DIAGNOSIS — G89.29 CHRONIC LEFT SHOULDER PAIN: ICD-10-CM

## 2024-07-29 DIAGNOSIS — M25.512 CHRONIC LEFT SHOULDER PAIN: ICD-10-CM

## 2024-07-29 DIAGNOSIS — M54.50 CHRONIC BILATERAL LOW BACK PAIN WITHOUT SCIATICA: ICD-10-CM

## 2024-07-29 DIAGNOSIS — M79.7 FIBROMYALGIA: ICD-10-CM

## 2024-07-29 PROCEDURE — 96368 THER/DIAG CONCURRENT INF: CPT | Mod: INF

## 2024-07-29 PROCEDURE — 96365 THER/PROPH/DIAG IV INF INIT: CPT | Mod: INF

## 2024-07-29 PROCEDURE — 2500000004 HC RX 250 GENERAL PHARMACY W/ HCPCS (ALT 636 FOR OP/ED): Performed by: NURSE PRACTITIONER

## 2024-07-29 PROCEDURE — 2500000005 HC RX 250 GENERAL PHARMACY W/O HCPCS: Performed by: NURSE PRACTITIONER

## 2024-07-29 PROCEDURE — 96375 TX/PRO/DX INJ NEW DRUG ADDON: CPT | Mod: INF

## 2024-07-29 RX ORDER — KETAMINE HCL IN NACL, ISO-OSM 100MG/10ML
SYRINGE (ML) INJECTION ONCE
Status: COMPLETED | OUTPATIENT
Start: 2024-07-29 | End: 2024-07-29

## 2024-07-29 RX ORDER — KETOROLAC TROMETHAMINE 30 MG/ML
30 INJECTION, SOLUTION INTRAMUSCULAR; INTRAVENOUS ONCE
Status: COMPLETED | OUTPATIENT
Start: 2024-07-29 | End: 2024-07-29

## 2024-07-29 RX ORDER — KETOROLAC TROMETHAMINE 30 MG/ML
30 INJECTION, SOLUTION INTRAMUSCULAR; INTRAVENOUS ONCE
OUTPATIENT
Start: 2024-08-12 | End: 2024-08-12

## 2024-07-29 RX ORDER — KETOROLAC TROMETHAMINE 10 MG/1
10 TABLET, FILM COATED ORAL 3 TIMES DAILY
Qty: 15 TABLET | Refills: 0 | Status: SHIPPED | OUTPATIENT
Start: 2024-07-29

## 2024-07-29 RX ORDER — EPINEPHRINE 0.3 MG/.3ML
0.3 INJECTION SUBCUTANEOUS EVERY 5 MIN PRN
OUTPATIENT
Start: 2024-08-12

## 2024-07-29 RX ORDER — DIPHENHYDRAMINE HYDROCHLORIDE 50 MG/ML
50 INJECTION INTRAMUSCULAR; INTRAVENOUS AS NEEDED
OUTPATIENT
Start: 2024-08-12

## 2024-07-29 RX ORDER — ALBUTEROL SULFATE 0.83 MG/ML
3 SOLUTION RESPIRATORY (INHALATION) AS NEEDED
OUTPATIENT
Start: 2024-08-12

## 2024-07-29 RX ORDER — KETAMINE HCL IN NACL, ISO-OSM 100MG/10ML
SYRINGE (ML) INJECTION ONCE
OUTPATIENT
Start: 2024-08-12

## 2024-07-29 RX ORDER — KETOROLAC TROMETHAMINE 10 MG/1
10 TABLET, FILM COATED ORAL EVERY 8 HOURS PRN
Qty: 15 TABLET | Refills: 0 | Status: CANCELLED | OUTPATIENT
Start: 2024-07-29

## 2024-07-29 RX ORDER — FAMOTIDINE 10 MG/ML
20 INJECTION INTRAVENOUS ONCE AS NEEDED
OUTPATIENT
Start: 2024-08-12

## 2024-07-29 ASSESSMENT — PAIN SCALES - GENERAL
PAINLEVEL: 9
PAINLEVEL_OUTOF10: 0 - NO PAIN
PAINLEVEL_OUTOF10: 9

## 2024-07-29 ASSESSMENT — PATIENT HEALTH QUESTIONNAIRE - PHQ9
1. LITTLE INTEREST OR PLEASURE IN DOING THINGS: SEVERAL DAYS
2. FEELING DOWN, DEPRESSED OR HOPELESS: SEVERAL DAYS
10. IF YOU CHECKED OFF ANY PROBLEMS, HOW DIFFICULT HAVE THESE PROBLEMS MADE IT FOR YOU TO DO YOUR WORK, TAKE CARE OF THINGS AT HOME, OR GET ALONG WITH OTHER PEOPLE: SOMEWHAT DIFFICULT
SUM OF ALL RESPONSES TO PHQ9 QUESTIONS 1 AND 2: 2

## 2024-07-29 ASSESSMENT — PAIN - FUNCTIONAL ASSESSMENT
PAIN_FUNCTIONAL_ASSESSMENT: 0-10
PAIN_FUNCTIONAL_ASSESSMENT: 0-10

## 2024-07-29 ASSESSMENT — LIFESTYLE VARIABLES
HOW MANY STANDARD DRINKS CONTAINING ALCOHOL DO YOU HAVE ON A TYPICAL DAY: 3 OR 4
HOW OFTEN DO YOU HAVE A DRINK CONTAINING ALCOHOL: MONTHLY OR LESS
AUDIT-C TOTAL SCORE: 2
SKIP TO QUESTIONS 9-10: 0
HOW OFTEN DO YOU HAVE SIX OR MORE DRINKS ON ONE OCCASION: NEVER

## 2024-07-29 ASSESSMENT — ENCOUNTER SYMPTOMS
LOSS OF SENSATION IN FEET: 0
DEPRESSION: 1
OCCASIONAL FEELINGS OF UNSTEADINESS: 0

## 2024-07-29 NOTE — PROGRESS NOTES
S: Patient here for 1st opioid sparing pain infusion.     Purpose of pain infusion meds explained along with potential side effects.  Patient verbalized understanding.    B: Pain Issues 9/10 left shoulder, left arm, middle lower back    A: Patient currently has pain described on flow sheet documentation. Designated  is Millard  @ [phone]. Patient last ate solid food 4 hours ago, and had liquid 1 hours ago.    R: Plan; Obtain IV access, do patient risk assessment, and start opioid sparing infusion as ordered. Monitoring for S/S of adverse reactions.

## 2024-07-29 NOTE — PATIENT INSTRUCTIONS
Today :We administered ketamine 30 mg-lidocaine 300 mg, propofol, and ketorolac.     For:   1. Fibromyalgia         Your next appointment is due in:  2 weeks        Please read the  Medication Guide that was given to you and reviewed during todays visit.     (Tell all doctors including dentists that you are taking this medication)     Go to the emergency room or call 911 if:  -You have signs of allergic reaction:   -Rash, hives, itching.   -Swollen, blistered, peeling skin.   -Swelling of face, lips, mouth, tongue or throat.   -Tightness of chest, trouble breathing, swallowing or talking     Call your doctor:  - If IV / injection site gets red, warm, swollen, itchy or leaks fluid or pus.     (Leave dressing on your IV site for at least 2 hours and keep area clean and dry  - If you get sick or have symptoms of infection or are not feeling well for any reason.    (Wash your hands often, stay away from people who are sick)  - If you have side effects from your medication that do not go away or are bothersome.     (Refer to the teaching your nurse gave you for side effects to call your doctor about)    - Common side effects may include:  stuffy nose, headache, feeling tired, muscle aches, upset stomach  - Before receiving any vaccines     - Call [facility] at   If:  - You get sick, are on antibiotics, have had a recent vaccine, have surgery or dental work and your doctor wants your visit rescheduled.  - You need to cancel and reschedule your visit for any reason. Call at least 2 days before your visit if you need to cancel.   - Your insurance changes before your next visit.    (We will need to get approval from your new insurance. This can take up to two weeks.)     [facility] is opened Monday thru Friday. We are closed on weekends and holidays.   Voice mail will take your call if the center is closed. If you leave a message please allow 24 hours for a call back during weekdays.  If you leave a message on a weekend/holiday, we will call you back the next business day.              [facility]      Pain Infusion Aftercare Instructions      1. It is normal to feel sedated, tired and low in energy after a pain infusion. DO NOT DRIVE, OPERATE ANY MACHINERY, OR MAKE ANY IMPORTANT DECISIONS FOR AT LEAST 24 HOURS AFTER THE INFUSION.     2. Call the pain center at [phone] with any problems, questions, or concerns.     3. Eat light after the infusion. If you feel queasy or sick to your stomach, laying down with your eyes closed may help. When you resume eating start with something mild like clear liquids, yogurt, applesauce, crackers, etc… Gradually advance to a regular diet.     4. Do not leave your house alone the evening of your pain infusion.     5. No alcohol or sedative medications, such as sleeping pills, for 24 hours after your pain infusion.     6. Resume all other prescribed medications unless directed otherwise by you physician.     7. If you have any medical emergencies, call 911 or go directly to the closest emergency room.       Patient Instructions for IV Opioid Sparing Infusions   IMPORTANT  You must have a responsible adult stay with you during your infusion and drive you home.  If you do not have a responsible adult with transportation home, your appointment will be rescheduled.   Follow a clear liquid diet for four hours before your infusion  Please note, you will not be permitted to eat or drink during your infusion, including breath mints and gum.  Take all medications as prescribed before your infusion- DO NOT WITHHOLD YOUR BLOOD PRESSURE MEDICATION  If you wear a CPAP or BiPAP, you may bring it to your infusion appointment.  Children under the age of 16 will not be permitted in the infusion clinic.   All Women are required to take a pregnancy test prior to their infusion unless they are above 55 years of age or have had a hysterectomy  If you need to cancel  your infusion, please call the Infusion Line at [phone] as soon as possible.  We would appreciate 48 hours notice.  We make every effort to schedule your infusions based off your physician recommendations.  However, there will be factors that affect our infusion        schedule and availability.  We appreciate your understanding.         Thank you for choosing [facility].

## 2024-08-02 ENCOUNTER — TELEPHONE (OUTPATIENT)
Dept: PAIN MEDICINE | Facility: CLINIC | Age: 54
End: 2024-08-02
Payer: COMMERCIAL

## 2024-08-02 ENCOUNTER — TELEPHONE (OUTPATIENT)
Dept: INFUSION THERAPY | Facility: CLINIC | Age: 54
End: 2024-08-02
Payer: COMMERCIAL

## 2024-08-02 DIAGNOSIS — N30.00 ACUTE CYSTITIS WITHOUT HEMATURIA: ICD-10-CM

## 2024-08-02 DIAGNOSIS — M96.1 LUMBAR POST-LAMINECTOMY SYNDROME: Primary | ICD-10-CM

## 2024-08-02 RX ORDER — NITROFURANTOIN (MACROCRYSTALS) 100 MG/1
100 CAPSULE ORAL 4 TIMES DAILY
Qty: 20 CAPSULE | Refills: 0 | Status: SHIPPED | OUTPATIENT
Start: 2024-08-02 | End: 2024-08-07

## 2024-08-02 RX ORDER — KETOROLAC TROMETHAMINE 10 MG/1
10 TABLET, FILM COATED ORAL EVERY 6 HOURS PRN
Qty: 20 TABLET | Refills: 0 | Status: SHIPPED | OUTPATIENT
Start: 2024-08-02 | End: 2024-08-07

## 2024-08-02 NOTE — TELEPHONE ENCOUNTER
Pt is in [place] and left toradol at home. Needs new script for 10 days. Also requesting something for an uti. Advised her to got to urgent care for the uti, but wanted me to ask anyways

## 2024-08-02 NOTE — TELEPHONE ENCOUNTER
FYI: Patient left message stating the she wanted to let dr redding and Ari know that she has a frozen left shoulder that she got a nerve block for and is getting another one on the 20th, as well as she is getting ketamine iv infusions. That is why her shoulder has been hurting for more than a year now.

## 2024-08-15 DIAGNOSIS — M79.7 FIBROMYALGIA: Primary | ICD-10-CM

## 2024-08-15 RX ORDER — KETOROLAC TROMETHAMINE 30 MG/ML
30 INJECTION, SOLUTION INTRAMUSCULAR; INTRAVENOUS ONCE
OUTPATIENT
Start: 2024-08-15 | End: 2024-08-15

## 2024-08-15 RX ORDER — KETAMINE HCL IN NACL, ISO-OSM 100MG/10ML
SYRINGE (ML) INJECTION ONCE
OUTPATIENT
Start: 2024-08-15

## 2024-08-20 ENCOUNTER — INFUSION (OUTPATIENT)
Dept: INFUSION THERAPY | Facility: CLINIC | Age: 54
End: 2024-08-20
Payer: COMMERCIAL

## 2024-08-20 VITALS
SYSTOLIC BLOOD PRESSURE: 136 MMHG | RESPIRATION RATE: 16 BRPM | HEART RATE: 70 BPM | DIASTOLIC BLOOD PRESSURE: 76 MMHG | OXYGEN SATURATION: 98 % | TEMPERATURE: 96.4 F

## 2024-08-20 DIAGNOSIS — M79.7 FIBROMYALGIA: ICD-10-CM

## 2024-08-20 PROCEDURE — 96368 THER/DIAG CONCURRENT INF: CPT | Mod: INF

## 2024-08-20 PROCEDURE — 2500000005 HC RX 250 GENERAL PHARMACY W/O HCPCS: Performed by: NURSE PRACTITIONER

## 2024-08-20 PROCEDURE — 2500000004 HC RX 250 GENERAL PHARMACY W/ HCPCS (ALT 636 FOR OP/ED): Performed by: NURSE PRACTITIONER

## 2024-08-20 PROCEDURE — 96375 TX/PRO/DX INJ NEW DRUG ADDON: CPT | Mod: INF

## 2024-08-20 PROCEDURE — 96365 THER/PROPH/DIAG IV INF INIT: CPT | Mod: INF

## 2024-08-20 RX ORDER — DIPHENHYDRAMINE HYDROCHLORIDE 50 MG/ML
50 INJECTION INTRAMUSCULAR; INTRAVENOUS AS NEEDED
OUTPATIENT
Start: 2024-08-26

## 2024-08-20 RX ORDER — KETAMINE HCL IN NACL, ISO-OSM 100MG/10ML
SYRINGE (ML) INJECTION ONCE
OUTPATIENT
Start: 2024-08-26

## 2024-08-20 RX ORDER — ALBUTEROL SULFATE 0.83 MG/ML
3 SOLUTION RESPIRATORY (INHALATION) AS NEEDED
OUTPATIENT
Start: 2024-08-26

## 2024-08-20 RX ORDER — KETAMINE HCL IN NACL, ISO-OSM 100MG/10ML
SYRINGE (ML) INJECTION ONCE
Status: COMPLETED | OUTPATIENT
Start: 2024-08-20 | End: 2024-08-20

## 2024-08-20 RX ORDER — FAMOTIDINE 10 MG/ML
20 INJECTION INTRAVENOUS ONCE AS NEEDED
OUTPATIENT
Start: 2024-08-26

## 2024-08-20 RX ORDER — KETOROLAC TROMETHAMINE 30 MG/ML
30 INJECTION, SOLUTION INTRAMUSCULAR; INTRAVENOUS ONCE
Status: COMPLETED | OUTPATIENT
Start: 2024-08-20 | End: 2024-08-20

## 2024-08-20 RX ORDER — KETOROLAC TROMETHAMINE 30 MG/ML
30 INJECTION, SOLUTION INTRAMUSCULAR; INTRAVENOUS ONCE
OUTPATIENT
Start: 2024-08-26 | End: 2024-08-26

## 2024-08-20 RX ORDER — EPINEPHRINE 0.3 MG/.3ML
0.3 INJECTION SUBCUTANEOUS EVERY 5 MIN PRN
OUTPATIENT
Start: 2024-08-26

## 2024-08-20 ASSESSMENT — ENCOUNTER SYMPTOMS
DEPRESSION: 1
LOSS OF SENSATION IN FEET: 0
OCCASIONAL FEELINGS OF UNSTEADINESS: 0

## 2024-08-20 ASSESSMENT — COLUMBIA-SUICIDE SEVERITY RATING SCALE - C-SSRS
6. HAVE YOU EVER DONE ANYTHING, STARTED TO DO ANYTHING, OR PREPARED TO DO ANYTHING TO END YOUR LIFE?: NO
2. HAVE YOU ACTUALLY HAD ANY THOUGHTS OF KILLING YOURSELF?: NO
1. IN THE PAST MONTH, HAVE YOU WISHED YOU WERE DEAD OR WISHED YOU COULD GO TO SLEEP AND NOT WAKE UP?: NO

## 2024-08-20 ASSESSMENT — PAIN SCALES - GENERAL
PAINLEVEL_OUTOF10: 7
PAINLEVEL_OUTOF10: 10 - WORST POSSIBLE PAIN

## 2024-08-20 NOTE — PATIENT INSTRUCTIONS
Today :Catherine RUTHAnish Jerome had no medications administered during this visit.     For:   1. Fibromyalgia         (Tell all doctors including dentists that you are taking this medication)     Go to the emergency room or call 911 if:  -You have signs of allergic reaction:   -Rash, hives, itching.   -Swollen, blistered, peeling skin.   -Swelling of face, lips, mouth, tongue or throat.   -Tightness of chest, trouble breathing, swallowing or talking     Call your doctor:  - If IV / injection site gets red, warm, swollen, itchy or leaks fluid or pus.     (Leave dressing on your IV site for at least 2 hours and keep area clean and dry  - If you get sick or have symptoms of infection or are not feeling well for any reason.    (Wash your hands often, stay away from people who are sick)  - If you have side effects from your medication that do not go away or are bothersome.     (Refer to the teaching your nurse gave you for side effects to call your doctor about)    - Common side effects may include:  stuffy nose, headache, feeling tired, muscle aches, upset stomach  - Before receiving any vaccines     - Call [facility] at   If:  - You get sick, are on antibiotics, have had a recent vaccine, have surgery or dental work and your doctor wants your visit rescheduled.  - You need to cancel and reschedule your visit for any reason. Call at least 2 days before your visit if you need to cancel.   - Your insurance changes before your next visit.    (We will need to get approval from your new insurance. This can take up to two weeks.)     [facility] is opened Monday thru Friday. We are closed on weekends and holidays.   Voice mail will take your call if the center is closed. If you leave a message please allow 24 hours for a call back during weekdays. If you leave a message on a weekend/holiday, we will call you back the next business day.              [facility]      Pain Infusion Aftercare  Instructions      1. It is normal to feel sedated, tired and low in energy after a pain infusion. DO NOT DRIVE, OPERATE ANY MACHINERY, OR MAKE ANY IMPORTANT DECISIONS FOR AT LEAST 24 HOURS AFTER THE INFUSION.     2. Call the pain center at [phone] with any problems, questions, or concerns.     3. Eat light after the infusion. If you feel queasy or sick to your stomach, laying down with your eyes closed may help. When you resume eating start with something mild like clear liquids, yogurt, applesauce, crackers, etc… Gradually advance to a regular diet.     4. Do not leave your house alone the evening of your pain infusion.     5. No alcohol or sedative medications, such as sleeping pills, for 24 hours after your pain infusion.     6. Resume all other prescribed medications unless directed otherwise by you physician.     7. If you have any medical emergencies, call 911 or go directly to the closest emergency room.

## 2024-08-21 DIAGNOSIS — M54.50 CHRONIC BILATERAL LOW BACK PAIN WITHOUT SCIATICA: ICD-10-CM

## 2024-08-21 DIAGNOSIS — G89.29 CHRONIC BILATERAL LOW BACK PAIN WITHOUT SCIATICA: ICD-10-CM

## 2024-08-21 RX ORDER — CYCLOBENZAPRINE HCL 10 MG
10 TABLET ORAL DAILY
Qty: 90 TABLET | Refills: 1 | OUTPATIENT
Start: 2024-08-21

## 2024-08-22 DIAGNOSIS — M54.50 CHRONIC BILATERAL LOW BACK PAIN WITHOUT SCIATICA: ICD-10-CM

## 2024-08-22 DIAGNOSIS — G89.29 CHRONIC BILATERAL LOW BACK PAIN WITHOUT SCIATICA: ICD-10-CM

## 2024-08-22 RX ORDER — CYCLOBENZAPRINE HCL 10 MG
10 TABLET ORAL DAILY
Qty: 30 TABLET | Refills: 0 | Status: SHIPPED | OUTPATIENT
Start: 2024-08-22 | End: 2024-09-21

## 2024-08-27 DIAGNOSIS — M79.7 FIBROMYALGIA: Primary | ICD-10-CM

## 2024-08-27 RX ORDER — ALBUTEROL SULFATE 0.83 MG/ML
3 SOLUTION RESPIRATORY (INHALATION) AS NEEDED
OUTPATIENT
Start: 2024-09-03

## 2024-08-27 RX ORDER — DIPHENHYDRAMINE HYDROCHLORIDE 50 MG/ML
50 INJECTION INTRAMUSCULAR; INTRAVENOUS AS NEEDED
OUTPATIENT
Start: 2024-09-03

## 2024-08-27 RX ORDER — FAMOTIDINE 10 MG/ML
20 INJECTION INTRAVENOUS ONCE AS NEEDED
OUTPATIENT
Start: 2024-09-03

## 2024-08-27 RX ORDER — KETAMINE HCL IN NACL, ISO-OSM 100MG/10ML
SYRINGE (ML) INJECTION ONCE
OUTPATIENT
Start: 2024-09-03

## 2024-08-27 RX ORDER — KETOROLAC TROMETHAMINE 30 MG/ML
30 INJECTION, SOLUTION INTRAMUSCULAR; INTRAVENOUS ONCE
OUTPATIENT
Start: 2024-09-03 | End: 2024-09-03

## 2024-08-27 RX ORDER — EPINEPHRINE 0.3 MG/.3ML
0.3 INJECTION SUBCUTANEOUS EVERY 5 MIN PRN
OUTPATIENT
Start: 2024-09-03

## 2024-09-03 ENCOUNTER — INFUSION (OUTPATIENT)
Dept: INFUSION THERAPY | Facility: CLINIC | Age: 54
End: 2024-09-03
Payer: COMMERCIAL

## 2024-09-03 VITALS
RESPIRATION RATE: 17 BRPM | TEMPERATURE: 96.3 F | DIASTOLIC BLOOD PRESSURE: 78 MMHG | HEART RATE: 67 BPM | OXYGEN SATURATION: 100 % | SYSTOLIC BLOOD PRESSURE: 151 MMHG

## 2024-09-03 DIAGNOSIS — M79.7 FIBROMYALGIA: Primary | ICD-10-CM

## 2024-09-03 PROCEDURE — 96375 TX/PRO/DX INJ NEW DRUG ADDON: CPT | Mod: INF

## 2024-09-03 PROCEDURE — 2500000004 HC RX 250 GENERAL PHARMACY W/ HCPCS (ALT 636 FOR OP/ED): Performed by: NURSE PRACTITIONER

## 2024-09-03 PROCEDURE — 96368 THER/DIAG CONCURRENT INF: CPT | Mod: INF

## 2024-09-03 PROCEDURE — 96365 THER/PROPH/DIAG IV INF INIT: CPT | Mod: INF

## 2024-09-03 PROCEDURE — 2500000005 HC RX 250 GENERAL PHARMACY W/O HCPCS: Performed by: NURSE PRACTITIONER

## 2024-09-03 RX ORDER — ALBUTEROL SULFATE 0.83 MG/ML
3 SOLUTION RESPIRATORY (INHALATION) AS NEEDED
OUTPATIENT
Start: 2024-09-17

## 2024-09-03 RX ORDER — KETOROLAC TROMETHAMINE 30 MG/ML
30 INJECTION, SOLUTION INTRAMUSCULAR; INTRAVENOUS ONCE
Status: COMPLETED | OUTPATIENT
Start: 2024-09-03 | End: 2024-09-03

## 2024-09-03 RX ORDER — FAMOTIDINE 10 MG/ML
20 INJECTION INTRAVENOUS ONCE AS NEEDED
OUTPATIENT
Start: 2024-09-17

## 2024-09-03 RX ORDER — ONDANSETRON 2 MG/ML
4 INJECTION INTRAMUSCULAR; INTRAVENOUS ONCE
Status: COMPLETED | OUTPATIENT
Start: 2024-09-03 | End: 2024-09-03

## 2024-09-03 RX ORDER — KETAMINE HCL IN NACL, ISO-OSM 100MG/10ML
SYRINGE (ML) INJECTION ONCE
Status: COMPLETED | OUTPATIENT
Start: 2024-09-03 | End: 2024-09-03

## 2024-09-03 RX ORDER — KETOROLAC TROMETHAMINE 30 MG/ML
30 INJECTION, SOLUTION INTRAMUSCULAR; INTRAVENOUS ONCE
OUTPATIENT
Start: 2024-09-17 | End: 2024-09-17

## 2024-09-03 RX ORDER — DIPHENHYDRAMINE HYDROCHLORIDE 50 MG/ML
50 INJECTION INTRAMUSCULAR; INTRAVENOUS AS NEEDED
OUTPATIENT
Start: 2024-09-17

## 2024-09-03 RX ORDER — EPINEPHRINE 0.3 MG/.3ML
0.3 INJECTION SUBCUTANEOUS EVERY 5 MIN PRN
OUTPATIENT
Start: 2024-09-17

## 2024-09-03 RX ORDER — KETAMINE HCL IN NACL, ISO-OSM 100MG/10ML
SYRINGE (ML) INJECTION ONCE
OUTPATIENT
Start: 2024-09-17

## 2024-09-03 ASSESSMENT — PAIN SCALES - GENERAL
PAINLEVEL_OUTOF10: 10 - WORST POSSIBLE PAIN
PAINLEVEL_OUTOF10: 3

## 2024-09-03 ASSESSMENT — ENCOUNTER SYMPTOMS
LOSS OF SENSATION IN FEET: 0
OCCASIONAL FEELINGS OF UNSTEADINESS: 0
DEPRESSION: 0

## 2024-09-03 ASSESSMENT — COLUMBIA-SUICIDE SEVERITY RATING SCALE - C-SSRS
1. IN THE PAST MONTH, HAVE YOU WISHED YOU WERE DEAD OR WISHED YOU COULD GO TO SLEEP AND NOT WAKE UP?: NO
2. HAVE YOU ACTUALLY HAD ANY THOUGHTS OF KILLING YOURSELF?: NO
6. HAVE YOU EVER DONE ANYTHING, STARTED TO DO ANYTHING, OR PREPARED TO DO ANYTHING TO END YOUR LIFE?: NO

## 2024-09-03 NOTE — PATIENT INSTRUCTIONS
Today :Catherine RUTHAnish Jerome had no medications administered during this visit.     For:   1. Fibromyalgia            (Tell all doctors including dentists that you are taking this medication)     Go to the emergency room or call 911 if:  -You have signs of allergic reaction:   -Rash, hives, itching.   -Swollen, blistered, peeling skin.   -Swelling of face, lips, mouth, tongue or throat.   -Tightness of chest, trouble breathing, swallowing or talking     Call your doctor:  - If IV / injection site gets red, warm, swollen, itchy or leaks fluid or pus.     (Leave dressing on your IV site for at least 2 hours and keep area clean and dry  - If you get sick or have symptoms of infection or are not feeling well for any reason.    (Wash your hands often, stay away from people who are sick)  - If you have side effects from your medication that do not go away or are bothersome.     (Refer to the teaching your nurse gave you for side effects to call your doctor about)    - Common side effects may include:  stuffy nose, headache, feeling tired, muscle aches, upset stomach  - Before receiving any vaccines     - Call the [facility] at   If:  - You get sick, are on antibiotics, have had a recent vaccine, have surgery or dental work and your doctor wants your visit rescheduled.  - You need to cancel and reschedule your visit for any reason. Call at least 2 days before your visit if you need to cancel.   - Your insurance changes before your next visit.    (We will need to get approval from your new insurance. This can take up to two weeks.)     [facility] is opened Monday thru Friday. We are closed on weekends and holidays.   Voice mail will take your call if the center is closed. If you leave a message please allow 24 hours for a call back during weekdays. If you leave a message on a weekend/holiday, we will call you back the next business day.              [facility]      Pain Infusion  Aftercare Instructions      1. It is normal to feel sedated, tired and low in energy after a pain infusion. DO NOT DRIVE, OPERATE ANY MACHINERY, OR MAKE ANY IMPORTANT DECISIONS FOR AT LEAST 24 HOURS AFTER THE INFUSION.     2. Call the pain center at [phone] with any problems, questions, or concerns.     3. Eat light after the infusion. If you feel queasy or sick to your stomach, laying down with your eyes closed may help. When you resume eating start with something mild like clear liquids, yogurt, applesauce, crackers, etc… Gradually advance to a regular diet.     4. Do not leave your house alone the evening of your pain infusion.     5. No alcohol or sedative medications, such as sleeping pills, for 24 hours after your pain infusion.     6. Resume all other prescribed medications unless directed otherwise by you physician.     7. If you have any medical emergencies, call 911 or go directly to the closest emergency room.

## 2024-09-03 NOTE — PROGRESS NOTES
S: Patient here for 3rd opioid sparing pain infusion. Patient reports 40% reduction in pain after last infusion that lasted 3 days.    Purpose of pain infusion meds explained along with potential side effects.  Patient verbalized understanding.    B: Pain Issues in left arm, shoulder, and mid to lower back    A: Patient currently has pain described on flow sheet documentation. Designated  is Freeman, spouse at [phone]. Patient last ate solid food 12 hours ago, and had liquid 1 hours ago.    R: Plan; Obtain IV access, do patient risk assessment, and start opioid sparing infusion as ordered. Monitoring for S/S of adverse reactions.    0927- Post infusion teaching provided. Patient verbalized understanding. VSS, Patient states pain is 3 in everything and 8 in the shoulder. Will assist patient to waiting car via wheelchair.

## 2024-09-05 DIAGNOSIS — M54.50 CHRONIC BILATERAL LOW BACK PAIN WITHOUT SCIATICA: ICD-10-CM

## 2024-09-05 DIAGNOSIS — G89.29 CHRONIC BILATERAL LOW BACK PAIN WITHOUT SCIATICA: ICD-10-CM

## 2024-09-05 RX ORDER — KETOROLAC TROMETHAMINE 10 MG/1
10 TABLET, FILM COATED ORAL 3 TIMES DAILY
Qty: 15 TABLET | Refills: 0 | Status: SHIPPED | OUTPATIENT
Start: 2024-09-05

## 2024-09-13 RX ORDER — DIPHENHYDRAMINE HYDROCHLORIDE 50 MG/ML
25 INJECTION INTRAMUSCULAR; INTRAVENOUS ONCE
OUTPATIENT
Start: 2024-09-19 | End: 2024-09-19

## 2024-09-13 RX ORDER — NITROGLYCERIN 0.4 MG/1
0.4 TABLET SUBLINGUAL ONCE
OUTPATIENT
Start: 2024-09-19 | End: 2024-09-19

## 2024-09-13 RX ORDER — KETAMINE HCL IN NACL, ISO-OSM 100MG/10ML
SYRINGE (ML) INJECTION ONCE
OUTPATIENT
Start: 2024-09-19

## 2024-09-13 RX ORDER — ONDANSETRON HYDROCHLORIDE 2 MG/ML
4 INJECTION, SOLUTION INTRAVENOUS ONCE
OUTPATIENT
Start: 2024-09-19 | End: 2024-09-19

## 2024-09-13 RX ORDER — KETOROLAC TROMETHAMINE 30 MG/ML
30 INJECTION, SOLUTION INTRAMUSCULAR; INTRAVENOUS ONCE
OUTPATIENT
Start: 2024-09-19 | End: 2024-09-19

## 2024-09-19 ENCOUNTER — INFUSION (OUTPATIENT)
Dept: INFUSION THERAPY | Facility: CLINIC | Age: 54
End: 2024-09-19
Payer: COMMERCIAL

## 2024-09-19 ENCOUNTER — APPOINTMENT (OUTPATIENT)
Dept: INFUSION THERAPY | Facility: CLINIC | Age: 54
End: 2024-09-19
Payer: COMMERCIAL

## 2024-09-19 VITALS
TEMPERATURE: 97.2 F | DIASTOLIC BLOOD PRESSURE: 90 MMHG | OXYGEN SATURATION: 96 % | SYSTOLIC BLOOD PRESSURE: 159 MMHG | HEART RATE: 81 BPM | RESPIRATION RATE: 12 BRPM

## 2024-09-19 DIAGNOSIS — M79.7 FIBROMYALGIA: Primary | ICD-10-CM

## 2024-09-19 DIAGNOSIS — M79.7 FIBROMYALGIA: ICD-10-CM

## 2024-09-19 PROCEDURE — 2500000005 HC RX 250 GENERAL PHARMACY W/O HCPCS: Performed by: NURSE PRACTITIONER

## 2024-09-19 PROCEDURE — 96368 THER/DIAG CONCURRENT INF: CPT | Mod: INF

## 2024-09-19 PROCEDURE — 96365 THER/PROPH/DIAG IV INF INIT: CPT | Mod: INF

## 2024-09-19 PROCEDURE — 96375 TX/PRO/DX INJ NEW DRUG ADDON: CPT | Mod: INF

## 2024-09-19 PROCEDURE — 2500000004 HC RX 250 GENERAL PHARMACY W/ HCPCS (ALT 636 FOR OP/ED): Performed by: NURSE PRACTITIONER

## 2024-09-19 RX ORDER — NITROGLYCERIN 0.4 MG/1
0.4 TABLET SUBLINGUAL ONCE
OUTPATIENT
Start: 2024-10-03 | End: 2024-10-03

## 2024-09-19 RX ORDER — KETAMINE HCL IN NACL, ISO-OSM 100MG/10ML
SYRINGE (ML) INJECTION ONCE
Status: COMPLETED | OUTPATIENT
Start: 2024-09-19 | End: 2024-09-19

## 2024-09-19 RX ORDER — DIPHENHYDRAMINE HYDROCHLORIDE 50 MG/ML
50 INJECTION INTRAMUSCULAR; INTRAVENOUS AS NEEDED
OUTPATIENT
Start: 2024-10-03

## 2024-09-19 RX ORDER — DIPHENHYDRAMINE HYDROCHLORIDE 50 MG/ML
25 INJECTION INTRAMUSCULAR; INTRAVENOUS ONCE
OUTPATIENT
Start: 2024-10-03 | End: 2024-10-03

## 2024-09-19 RX ORDER — ONDANSETRON HYDROCHLORIDE 2 MG/ML
4 INJECTION, SOLUTION INTRAVENOUS ONCE
OUTPATIENT
Start: 2024-10-03 | End: 2024-10-03

## 2024-09-19 RX ORDER — FAMOTIDINE 10 MG/ML
20 INJECTION INTRAVENOUS ONCE AS NEEDED
OUTPATIENT
Start: 2024-10-03

## 2024-09-19 RX ORDER — KETOROLAC TROMETHAMINE 30 MG/ML
30 INJECTION, SOLUTION INTRAMUSCULAR; INTRAVENOUS ONCE
OUTPATIENT
Start: 2024-10-03 | End: 2024-10-03

## 2024-09-19 RX ORDER — KETAMINE HCL IN NACL, ISO-OSM 100MG/10ML
SYRINGE (ML) INJECTION ONCE
OUTPATIENT
Start: 2024-10-03

## 2024-09-19 RX ORDER — ALBUTEROL SULFATE 0.83 MG/ML
3 SOLUTION RESPIRATORY (INHALATION) AS NEEDED
OUTPATIENT
Start: 2024-10-03

## 2024-09-19 RX ORDER — ONDANSETRON HYDROCHLORIDE 2 MG/ML
4 INJECTION, SOLUTION INTRAVENOUS ONCE
Status: COMPLETED | OUTPATIENT
Start: 2024-09-19 | End: 2024-09-19

## 2024-09-19 RX ORDER — KETOROLAC TROMETHAMINE 30 MG/ML
30 INJECTION, SOLUTION INTRAMUSCULAR; INTRAVENOUS ONCE
Status: COMPLETED | OUTPATIENT
Start: 2024-09-19 | End: 2024-09-19

## 2024-09-19 RX ORDER — EPINEPHRINE 0.3 MG/.3ML
0.3 INJECTION SUBCUTANEOUS EVERY 5 MIN PRN
OUTPATIENT
Start: 2024-10-03

## 2024-09-19 ASSESSMENT — PAIN SCALES - GENERAL
PAINLEVEL: 10-WORST PAIN EVER
PAINLEVEL_OUTOF10: 0 - NO PAIN
PAINLEVEL_OUTOF10: 10 - WORST POSSIBLE PAIN
PAINLEVEL_OUTOF10: 10 - WORST POSSIBLE PAIN

## 2024-09-19 ASSESSMENT — ENCOUNTER SYMPTOMS
OCCASIONAL FEELINGS OF UNSTEADINESS: 0
DEPRESSION: 1
LOSS OF SENSATION IN FEET: 0

## 2024-09-19 ASSESSMENT — PAIN DESCRIPTION - LOCATION: LOCATION: OTHER (COMMENT)

## 2024-09-19 NOTE — PROGRESS NOTES
S: Patient here for 4th opioid sparing pain infusion. Patient reports 40% reduction in pain after last infusion that lasted 3 days.    Purpose of pain infusion meds explained along with potential side effects.  Patient verbalized understanding.    B: Pain Issues: back, left shoulder, chronic pain 10/10 Is Patient breast feeding: no    A: Patient currently has pain described on flow sheet documentation. Designated  is Freeman, patients spouse. Patient last ate solid food 4 hours ago, and had liquid 1 hours ago.    R: Plan; Obtain IV access, do patient risk assessment, and start opioid sparing infusion as ordered. Monitoring for S/S of adverse reactions.    Post infusion teaching provided. Patient verbalized understanding. VSS, Patient states pain is 0/10. Will assist patient to waiting car via wheelchair.

## 2024-09-19 NOTE — PATIENT INSTRUCTIONS
Today :We administered ketamine 30 mg-lidocaine 300 mg, propofol, ketorolac, and ondansetron.     For:   1. Fibromyalgia          (Tell all doctors including dentists that you are taking this medication)     Go to the emergency room or call 911 if:  -You have signs of allergic reaction:   -Rash, hives, itching.   -Swollen, blistered, peeling skin.   -Swelling of face, lips, mouth, tongue or throat.   -Tightness of chest, trouble breathing, swallowing or talking     Call your doctor:  - If IV / injection site gets red, warm, swollen, itchy or leaks fluid or pus.     (Leave dressing on your IV site for at least 2 hours and keep area clean and dry  - If you get sick or have symptoms of infection or are not feeling well for any reason.    (Wash your hands often, stay away from people who are sick)  - If you have side effects from your medication that do not go away or are bothersome.     (Refer to the teaching your nurse gave you for side effects to call your doctor about)    - Common side effects may include:  stuffy nose, headache, feeling tired, muscle aches, upset stomach  - Before receiving any vaccines     - Call the [facility] at   If:  - You get sick, are on antibiotics, have had a recent vaccine, have surgery or dental work and your doctor wants your visit rescheduled.  - You need to cancel and reschedule your visit for any reason. Call at least 2 days before your visit if you need to cancel.   - Your insurance changes before your next visit.    (We will need to get approval from your new insurance. This can take up to two weeks.)     [facility] is opened Monday thru Friday. We are closed on weekends and holidays.   Voice mail will take your call if the center is closed. If you leave a message please allow 24 hours for a call back during weekdays. If you leave a message on a weekend/holiday, we will call you back the next business day.              [facility] [place]      Pain  Infusion Aftercare Instructions      1. It is normal to feel sedated, tired and low in energy after a pain infusion. DO NOT DRIVE, OPERATE ANY MACHINERY, OR MAKE ANY IMPORTANT DECISIONS FOR AT LEAST 24 HOURS AFTER THE INFUSION.     2. Call the pain center at [phone] with any problems, questions, or concerns.     3. Eat light after the infusion. If you feel queasy or sick to your stomach, laying down with your eyes closed may help. When you resume eating start with something mild like clear liquids, yogurt, applesauce, crackers, etc… Gradually advance to a regular diet.     4. Do not leave your house alone the evening of your pain infusion.     5. No alcohol or sedative medications, such as sleeping pills, for 24 hours after your pain infusion.     6. Resume all other prescribed medications unless directed otherwise by you physician.     7. If you have any medical emergencies, call 911 or go directly to the closest emergency room.

## 2024-09-20 DIAGNOSIS — M54.50 CHRONIC BILATERAL LOW BACK PAIN WITHOUT SCIATICA: ICD-10-CM

## 2024-09-20 DIAGNOSIS — G89.29 CHRONIC BILATERAL LOW BACK PAIN WITHOUT SCIATICA: ICD-10-CM

## 2024-09-20 RX ORDER — KETOROLAC TROMETHAMINE 10 MG/1
10 TABLET, FILM COATED ORAL 3 TIMES DAILY
Qty: 15 TABLET | Refills: 0 | Status: SHIPPED | OUTPATIENT
Start: 2024-09-20

## 2024-09-23 ENCOUNTER — APPOINTMENT (OUTPATIENT)
Dept: PRIMARY CARE | Facility: CLINIC | Age: 54
End: 2024-09-23
Payer: COMMERCIAL

## 2024-09-23 VITALS — SYSTOLIC BLOOD PRESSURE: 128 MMHG | DIASTOLIC BLOOD PRESSURE: 76 MMHG | HEART RATE: 72 BPM

## 2024-09-23 DIAGNOSIS — E03.9 ACQUIRED HYPOTHYROIDISM: ICD-10-CM

## 2024-09-23 DIAGNOSIS — G89.29 CHRONIC BILATERAL LOW BACK PAIN WITHOUT SCIATICA: ICD-10-CM

## 2024-09-23 DIAGNOSIS — E78.2 COMBINED HYPERLIPIDEMIA: Primary | ICD-10-CM

## 2024-09-23 DIAGNOSIS — R39.15 URINARY URGENCY: ICD-10-CM

## 2024-09-23 DIAGNOSIS — Z12.31 ENCOUNTER FOR SCREENING MAMMOGRAM FOR MALIGNANT NEOPLASM OF BREAST: ICD-10-CM

## 2024-09-23 DIAGNOSIS — L98.9 SKIN LESION OF LEFT ARM: ICD-10-CM

## 2024-09-23 DIAGNOSIS — M54.50 CHRONIC BILATERAL LOW BACK PAIN WITHOUT SCIATICA: ICD-10-CM

## 2024-09-23 DIAGNOSIS — F32.A DEPRESSION, UNSPECIFIED DEPRESSION TYPE: ICD-10-CM

## 2024-09-23 DIAGNOSIS — F51.04 CHRONIC INSOMNIA: ICD-10-CM

## 2024-09-23 DIAGNOSIS — L73.9 FOLLICULITIS: ICD-10-CM

## 2024-09-23 PROCEDURE — 88305 TISSUE EXAM BY PATHOLOGIST: CPT

## 2024-09-23 PROCEDURE — 90715 TDAP VACCINE 7 YRS/> IM: CPT | Performed by: FAMILY MEDICINE

## 2024-09-23 PROCEDURE — 90471 IMMUNIZATION ADMIN: CPT | Performed by: FAMILY MEDICINE

## 2024-09-23 PROCEDURE — 11104 PUNCH BX SKIN SINGLE LESION: CPT | Performed by: FAMILY MEDICINE

## 2024-09-23 PROCEDURE — 99214 OFFICE O/P EST MOD 30 MIN: CPT | Performed by: FAMILY MEDICINE

## 2024-09-23 RX ORDER — CLINDAMYCIN HYDROCHLORIDE 300 MG/1
300 CAPSULE ORAL 4 TIMES DAILY
Qty: 40 CAPSULE | Refills: 0 | Status: SHIPPED | OUTPATIENT
Start: 2024-09-23 | End: 2024-10-03

## 2024-09-23 RX ORDER — ATORVASTATIN CALCIUM 40 MG/1
40 TABLET, FILM COATED ORAL NIGHTLY
Qty: 90 TABLET | Refills: 3 | Status: SHIPPED | OUTPATIENT
Start: 2024-09-23

## 2024-09-23 RX ORDER — CYCLOBENZAPRINE HCL 10 MG
10 TABLET ORAL DAILY
Qty: 90 TABLET | Refills: 0 | Status: SHIPPED | OUTPATIENT
Start: 2024-09-23

## 2024-09-23 RX ORDER — TRAZODONE HYDROCHLORIDE 50 MG/1
50 TABLET ORAL NIGHTLY
Qty: 90 TABLET | Refills: 0 | Status: SHIPPED | OUTPATIENT
Start: 2024-09-23

## 2024-09-23 RX ORDER — LEVOTHYROXINE SODIUM 112 UG/1
112 TABLET ORAL
Qty: 90 TABLET | Refills: 3 | Status: SHIPPED | OUTPATIENT
Start: 2024-09-23

## 2024-09-23 RX ORDER — FLUCONAZOLE 150 MG/1
150 TABLET ORAL ONCE
Qty: 1 TABLET | Refills: 0 | Status: SHIPPED | OUTPATIENT
Start: 2024-09-23 | End: 2024-09-23

## 2024-09-23 RX ORDER — OXYBUTYNIN CHLORIDE 5 MG/1
5 TABLET ORAL 2 TIMES DAILY
Qty: 180 TABLET | Refills: 3 | Status: SHIPPED | OUTPATIENT
Start: 2024-09-23

## 2024-09-23 RX ORDER — VENLAFAXINE HYDROCHLORIDE 150 MG/1
CAPSULE, EXTENDED RELEASE ORAL
Qty: 90 CAPSULE | Refills: 3 | Status: SHIPPED | OUTPATIENT
Start: 2024-09-23

## 2024-09-23 NOTE — ASSESSMENT & PLAN NOTE
Depression, well controlled with effexor. No HI/SI. Cont. the same. f/u in 6 mos    Orders:    venlafaxine XR (Effexor-XR) 150 mg 24 hr capsule; 1 cap po qd

## 2024-09-23 NOTE — ASSESSMENT & PLAN NOTE
Recommend biopsy for evaluation. Pt agreed to the procedure. Under sterile condition with betadine and 1 ml 1% lidocaine anesthesia, the left forearm  was biopsied  and sent to pathology. The wound was covered with sterile gauze. Advise pt to keep area clean and dry. We will notify the pt of the pathology result of the skin biopsy.    Orders:    Dermatopathology- DERM LAB

## 2024-09-23 NOTE — H&P (VIEW-ONLY)
Subjective   Patient ID: Catherine Cano is a 54 y.o. female who presents for left ger skin lesion for a few mos and tender spot on face for a few mos    HPI   skin lesion at left forearm for many mos and fluctuant tender spot on face for a few mos. No fever or chills. pt denies depressed mood, lack of interests or fatigue while on effexor. No HI/SI. Normal appetite.  good sleep with trazodone. No GI upset  or muscle ache while on statin for high lipids.  No CP, HA, dizziness, heart palpitation. No claudication or cold LE.  No LE edema. No imbalance or falls. Low back pain was partially controlled with flexeril. Urinary urgency was controlled with oxybutynin. No confusion, blurry vision or memory loss.  pt denies having fatigue, cold or heat intolerance while on current dose of  Levoxyl. No vision change, dry skin or constipation. No wt changes.       Review of Systems    Objective   /76   Pulse 72     Physical Exam  NAD, well groomed, eyes: , No sclera icterus. neck: supple, no cervical lymphadenopathy, no thyroid tenderness, nodules or enlargement.  lungs: CTA b/l, heart: RRR, no LE edema, abd: soft, no tenderness, BS+, cva percussion was negative, mild low back tenderness, normal strength and sensation  at bilateral lower extremities.  No dry skin or dry hair. There was 3x4mm skin lesion at left forearm and a 3mm folliculitis at rt face with tenderness, No tremor. Good balance. CNII-XII were grossly intact, No depressed mood      Assessment/Plan   Assessment & Plan  Combined hyperlipidemia  Hyperlipidemia on statin. No GI upset or muscle ache. Will monitor labs for evaluation.  Health diet and regular exercise. Decrease calorie intake to lose wt.  Rtc for cpe    Orders:    atorvastatin (Lipitor) 40 mg tablet; Take 1 tablet (40 mg) by mouth once daily at bedtime.    Chronic bilateral low back pain without sciatica  Stable. Cont flexeril. Fu with pain specialsit  Orders:    cyclobenzaprine (Flexeril) 10  mg tablet; Take 1 tablet (10 mg) by mouth once daily.    Acquired hypothyroidism  Hypothyroidism, asymptomatic. Cont the same dose of levothyroxine. Rtc for cpe.    Orders:    levothyroxine (Synthroid, Levoxyl) 112 mcg tablet; Take 1 tablet (112 mcg) by mouth once daily in the morning. Take before meals.    Urinary urgency  Controlled. Cont the same  Orders:    oxybutynin (Ditropan) 5 mg tablet; Take 1 tablet (5 mg) by mouth 2 times a day.    Chronic insomnia  Insomnia, controlled with doxepin trazodone. Recommend psychology counseling.  Recommend good sleep hygiene. Caution side effects of trazodone such as feeling sleepy or tired, headaches, nausea, constipation, dry mouth etc. Pt declined sleep specialist evaluation. Will monitor.    Orders:    traZODone (Desyrel) 50 mg tablet; Take 1 tablet (50 mg) by mouth once daily at bedtime.    Depression, unspecified depression type  Depression, well controlled with effexor. No HI/SI. Cont. the same. f/u in 6 mos    Orders:    venlafaxine XR (Effexor-XR) 150 mg 24 hr capsule; 1 cap po qd    Encounter for screening mammogram for malignant neoplasm of breast    Orders:    BI mammo bilateral screening tomosynthesis; Future    Skin lesion of left arm  Recommend biopsy for evaluation. Pt agreed to the procedure. Under sterile condition with betadine and 1 ml 1% lidocaine anesthesia, the left forearm  was biopsied  and sent to pathology. The wound was covered with sterile gauze. Advise pt to keep area clean and dry. We will notify the pt of the pathology result of the skin biopsy.    Orders:    Dermatopathology- DERM LAB    Folliculitis  Warm compress and abx as dir. Pt prefer cleocin which helped relieve her skin infection well  before  Orders:    clindamycin (Cleocin) 300 mg capsule; Take 1 capsule (300 mg) by mouth 4 times a day for 10 days.    fluconazole (Diflucan) 150 mg tablet; Take 1 tablet (150 mg) by mouth 1 time for 1 dose.

## 2024-09-23 NOTE — ASSESSMENT & PLAN NOTE
Controlled. Cont the same  Orders:    oxybutynin (Ditropan) 5 mg tablet; Take 1 tablet (5 mg) by mouth 2 times a day.

## 2024-09-23 NOTE — PROGRESS NOTES
Subjective   Patient ID: Catherine Cano is a 54 y.o. female who presents for left ger skin lesion for a few mos and tender spot on face for a few mos    HPI   skin lesion at left forearm for many mos and fluctuant tender spot on face for a few mos. No fever or chills. pt denies depressed mood, lack of interests or fatigue while on effexor. No HI/SI. Normal appetite.  good sleep with trazodone. No GI upset  or muscle ache while on statin for high lipids.  No CP, HA, dizziness, heart palpitation. No claudication or cold LE.  No LE edema. No imbalance or falls. Low back pain was partially controlled with flexeril. Urinary urgency was controlled with oxybutynin. No confusion, blurry vision or memory loss.  pt denies having fatigue, cold or heat intolerance while on current dose of  Levoxyl. No vision change, dry skin or constipation. No wt changes.       Review of Systems    Objective   /76   Pulse 72     Physical Exam  NAD, well groomed, eyes: , No sclera icterus. neck: supple, no cervical lymphadenopathy, no thyroid tenderness, nodules or enlargement.  lungs: CTA b/l, heart: RRR, no LE edema, abd: soft, no tenderness, BS+, cva percussion was negative, mild low back tenderness, normal strength and sensation  at bilateral lower extremities.  No dry skin or dry hair. There was 3x4mm skin lesion at left forearm and a 3mm folliculitis at rt face with tenderness, No tremor. Good balance. CNII-XII were grossly intact, No depressed mood      Assessment/Plan   Assessment & Plan  Combined hyperlipidemia  Hyperlipidemia on statin. No GI upset or muscle ache. Will monitor labs for evaluation.  Health diet and regular exercise. Decrease calorie intake to lose wt.  Rtc for cpe    Orders:    atorvastatin (Lipitor) 40 mg tablet; Take 1 tablet (40 mg) by mouth once daily at bedtime.    Chronic bilateral low back pain without sciatica  Stable. Cont flexeril. Fu with pain specialsit  Orders:    cyclobenzaprine (Flexeril) 10  mg tablet; Take 1 tablet (10 mg) by mouth once daily.    Acquired hypothyroidism  Hypothyroidism, asymptomatic. Cont the same dose of levothyroxine. Rtc for cpe.    Orders:    levothyroxine (Synthroid, Levoxyl) 112 mcg tablet; Take 1 tablet (112 mcg) by mouth once daily in the morning. Take before meals.    Urinary urgency  Controlled. Cont the same  Orders:    oxybutynin (Ditropan) 5 mg tablet; Take 1 tablet (5 mg) by mouth 2 times a day.    Chronic insomnia  Insomnia, controlled with trazodone. Recommend psychology counseling.  Recommend good sleep hygiene. Caution side effects of trazodone such as feeling sleepy or tired, headaches, nausea, constipation, dry mouth etc. Pt declined sleep specialist evaluation. Will monitor.    Orders:    traZODone (Desyrel) 50 mg tablet; Take 1 tablet (50 mg) by mouth once daily at bedtime.    Depression, unspecified depression type  Depression, well controlled with effexor. No HI/SI. Cont. the same. f/u in 6 mos    Orders:    venlafaxine XR (Effexor-XR) 150 mg 24 hr capsule; 1 cap po qd    Encounter for screening mammogram for malignant neoplasm of breast    Orders:    BI mammo bilateral screening tomosynthesis; Future    Skin lesion of left arm  Recommend biopsy for evaluation. Pt agreed to the procedure. Under sterile condition with betadine and 1 ml 1% lidocaine anesthesia, the left forearm  was biopsied  and sent to pathology. The wound was covered with sterile gauze. Advise pt to keep area clean and dry. We will notify the pt of the pathology result of the skin biopsy.    Orders:    Dermatopathology- DERM LAB    Folliculitis  Warm compress and abx as dir. Pt prefer cleocin which helped relieve her skin infection well  before  Orders:    clindamycin (Cleocin) 300 mg capsule; Take 1 capsule (300 mg) by mouth 4 times a day for 10 days.    fluconazole (Diflucan) 150 mg tablet; Take 1 tablet (150 mg) by mouth 1 time for 1 dose.

## 2024-09-23 NOTE — ASSESSMENT & PLAN NOTE
Stable. Cont flexeril. Fu with pain specialsit  Orders:    cyclobenzaprine (Flexeril) 10 mg tablet; Take 1 tablet (10 mg) by mouth once daily.

## 2024-09-23 NOTE — ASSESSMENT & PLAN NOTE
Hyperlipidemia on statin. No GI upset or muscle ache. Will monitor labs for evaluation.  Health diet and regular exercise. Decrease calorie intake to lose wt.  Rtc for cpe    Orders:    atorvastatin (Lipitor) 40 mg tablet; Take 1 tablet (40 mg) by mouth once daily at bedtime.

## 2024-09-23 NOTE — ASSESSMENT & PLAN NOTE
Warm compress and abx as dir. Pt prefer cleocin which helped relieve her skin infection well  before  Orders:    clindamycin (Cleocin) 300 mg capsule; Take 1 capsule (300 mg) by mouth 4 times a day for 10 days.    fluconazole (Diflucan) 150 mg tablet; Take 1 tablet (150 mg) by mouth 1 time for 1 dose.

## 2024-09-23 NOTE — ASSESSMENT & PLAN NOTE
Hypothyroidism, asymptomatic. Cont the same dose of levothyroxine. Rtc for cpe.    Orders:    levothyroxine (Synthroid, Levoxyl) 112 mcg tablet; Take 1 tablet (112 mcg) by mouth once daily in the morning. Take before meals.

## 2024-09-23 NOTE — ASSESSMENT & PLAN NOTE
Insomnia, controlled with trazodone. Recommend psychology counseling.  Recommend good sleep hygiene. Caution side effects of trazodone such as feeling sleepy or tired, headaches, nausea, constipation, dry mouth etc. Pt declined sleep specialist evaluation. Will monitor.    Orders:    traZODone (Desyrel) 50 mg tablet; Take 1 tablet (50 mg) by mouth once daily at bedtime.

## 2024-09-26 LAB
LABORATORY COMMENT REPORT: NORMAL
PATH REPORT.FINAL DX SPEC: NORMAL
PATH REPORT.GROSS SPEC: NORMAL
PATH REPORT.RELEVANT HX SPEC: NORMAL
PATH REPORT.TOTAL CANCER: NORMAL

## 2024-10-07 ENCOUNTER — HOSPITAL ENCOUNTER (OUTPATIENT)
Dept: PAIN MEDICINE | Facility: CLINIC | Age: 54
Discharge: HOME | End: 2024-10-07
Payer: COMMERCIAL

## 2024-10-07 ENCOUNTER — TELEPHONE (OUTPATIENT)
Dept: PAIN MEDICINE | Facility: CLINIC | Age: 54
End: 2024-10-07

## 2024-10-07 VITALS
WEIGHT: 180 LBS | SYSTOLIC BLOOD PRESSURE: 143 MMHG | TEMPERATURE: 97.2 F | HEIGHT: 65 IN | HEART RATE: 70 BPM | RESPIRATION RATE: 16 BRPM | BODY MASS INDEX: 29.99 KG/M2 | DIASTOLIC BLOOD PRESSURE: 78 MMHG | OXYGEN SATURATION: 97 %

## 2024-10-07 DIAGNOSIS — M24.612 ANKYLOSIS OF LEFT SHOULDER: ICD-10-CM

## 2024-10-07 DIAGNOSIS — M79.7 FIBROMYALGIA: ICD-10-CM

## 2024-10-07 PROCEDURE — 64418 NJX AA&/STRD SPRSCAP NRV: CPT | Performed by: ANESTHESIOLOGY

## 2024-10-07 PROCEDURE — 2550000001 HC RX 255 CONTRASTS: Performed by: ANESTHESIOLOGY

## 2024-10-07 PROCEDURE — 2500000004 HC RX 250 GENERAL PHARMACY W/ HCPCS (ALT 636 FOR OP/ED): Performed by: ANESTHESIOLOGY

## 2024-10-07 PROCEDURE — 96372 THER/PROPH/DIAG INJ SC/IM: CPT | Performed by: ANESTHESIOLOGY

## 2024-10-07 PROCEDURE — 77002 NEEDLE LOCALIZATION BY XRAY: CPT | Performed by: ANESTHESIOLOGY

## 2024-10-07 RX ORDER — KETOROLAC TROMETHAMINE 15 MG/ML
INJECTION, SOLUTION INTRAMUSCULAR; INTRAVENOUS AS NEEDED
Status: COMPLETED | OUTPATIENT
Start: 2024-10-07 | End: 2024-10-07

## 2024-10-07 RX ORDER — TRIAMCINOLONE ACETONIDE 40 MG/ML
INJECTION, SUSPENSION INTRA-ARTICULAR; INTRAMUSCULAR AS NEEDED
Status: COMPLETED | OUTPATIENT
Start: 2024-10-07 | End: 2024-10-07

## 2024-10-07 RX ORDER — LIDOCAINE HYDROCHLORIDE 5 MG/ML
INJECTION, SOLUTION INFILTRATION; INTRAVENOUS AS NEEDED
Status: COMPLETED | OUTPATIENT
Start: 2024-10-07 | End: 2024-10-07

## 2024-10-07 RX ORDER — BUPIVACAINE HYDROCHLORIDE 7.5 MG/ML
INJECTION, SOLUTION EPIDURAL; RETROBULBAR AS NEEDED
Status: COMPLETED | OUTPATIENT
Start: 2024-10-07 | End: 2024-10-07

## 2024-10-07 ASSESSMENT — PAIN SCALES - GENERAL: PAINLEVEL_OUTOF10: 10 - WORST POSSIBLE PAIN

## 2024-10-07 ASSESSMENT — ENCOUNTER SYMPTOMS
OCCASIONAL FEELINGS OF UNSTEADINESS: 1
DEPRESSION: 0
LOSS OF SENSATION IN FEET: 0

## 2024-10-07 ASSESSMENT — PAIN - FUNCTIONAL ASSESSMENT
PAIN_FUNCTIONAL_ASSESSMENT: 0-10
PAIN_FUNCTIONAL_ASSESSMENT: 0-10

## 2024-10-08 ENCOUNTER — INFUSION (OUTPATIENT)
Dept: INFUSION THERAPY | Facility: CLINIC | Age: 54
End: 2024-10-08
Payer: COMMERCIAL

## 2024-10-08 VITALS
TEMPERATURE: 97.7 F | OXYGEN SATURATION: 97 % | SYSTOLIC BLOOD PRESSURE: 145 MMHG | HEART RATE: 85 BPM | RESPIRATION RATE: 12 BRPM | DIASTOLIC BLOOD PRESSURE: 90 MMHG

## 2024-10-08 DIAGNOSIS — M79.7 FIBROMYALGIA: Primary | ICD-10-CM

## 2024-10-08 PROCEDURE — 96368 THER/DIAG CONCURRENT INF: CPT | Mod: INF

## 2024-10-08 PROCEDURE — 96375 TX/PRO/DX INJ NEW DRUG ADDON: CPT | Mod: INF

## 2024-10-08 PROCEDURE — 2500000004 HC RX 250 GENERAL PHARMACY W/ HCPCS (ALT 636 FOR OP/ED): Performed by: NURSE PRACTITIONER

## 2024-10-08 PROCEDURE — 2500000005 HC RX 250 GENERAL PHARMACY W/O HCPCS: Performed by: NURSE PRACTITIONER

## 2024-10-08 PROCEDURE — 96365 THER/PROPH/DIAG IV INF INIT: CPT | Mod: INF

## 2024-10-08 RX ORDER — KETAMINE HCL IN NACL, ISO-OSM 100MG/10ML
SYRINGE (ML) INJECTION ONCE
OUTPATIENT
Start: 2024-10-22

## 2024-10-08 RX ORDER — KETOROLAC TROMETHAMINE 30 MG/ML
30 INJECTION, SOLUTION INTRAMUSCULAR; INTRAVENOUS ONCE
Status: COMPLETED | OUTPATIENT
Start: 2024-10-08 | End: 2024-10-08

## 2024-10-08 RX ORDER — KETOROLAC TROMETHAMINE 30 MG/ML
30 INJECTION, SOLUTION INTRAMUSCULAR; INTRAVENOUS ONCE
OUTPATIENT
Start: 2024-10-22 | End: 2024-10-22

## 2024-10-08 RX ORDER — ALBUTEROL SULFATE 0.83 MG/ML
3 SOLUTION RESPIRATORY (INHALATION) AS NEEDED
OUTPATIENT
Start: 2024-10-22

## 2024-10-08 RX ORDER — NITROGLYCERIN 0.4 MG/1
0.4 TABLET SUBLINGUAL ONCE
OUTPATIENT
Start: 2024-10-22 | End: 2024-10-22

## 2024-10-08 RX ORDER — DIPHENHYDRAMINE HYDROCHLORIDE 50 MG/ML
25 INJECTION INTRAMUSCULAR; INTRAVENOUS ONCE
OUTPATIENT
Start: 2024-10-22 | End: 2024-10-22

## 2024-10-08 RX ORDER — ONDANSETRON HYDROCHLORIDE 2 MG/ML
4 INJECTION, SOLUTION INTRAVENOUS ONCE
Status: COMPLETED | OUTPATIENT
Start: 2024-10-08 | End: 2024-10-08

## 2024-10-08 RX ORDER — ONDANSETRON HYDROCHLORIDE 2 MG/ML
4 INJECTION, SOLUTION INTRAVENOUS ONCE
OUTPATIENT
Start: 2024-10-22 | End: 2024-10-22

## 2024-10-08 RX ORDER — KETAMINE HCL IN NACL, ISO-OSM 100MG/10ML
SYRINGE (ML) INJECTION ONCE
Status: COMPLETED | OUTPATIENT
Start: 2024-10-08 | End: 2024-10-08

## 2024-10-08 RX ORDER — FAMOTIDINE 10 MG/ML
20 INJECTION INTRAVENOUS ONCE AS NEEDED
OUTPATIENT
Start: 2024-10-22

## 2024-10-08 RX ORDER — EPINEPHRINE 0.3 MG/.3ML
0.3 INJECTION SUBCUTANEOUS EVERY 5 MIN PRN
OUTPATIENT
Start: 2024-10-22

## 2024-10-08 RX ORDER — DIPHENHYDRAMINE HYDROCHLORIDE 50 MG/ML
50 INJECTION INTRAMUSCULAR; INTRAVENOUS AS NEEDED
OUTPATIENT
Start: 2024-10-22

## 2024-10-08 ASSESSMENT — ENCOUNTER SYMPTOMS
OCCASIONAL FEELINGS OF UNSTEADINESS: 0
DEPRESSION: 1
LOSS OF SENSATION IN FEET: 0

## 2024-10-08 ASSESSMENT — PAIN SCALES - GENERAL
PAINLEVEL: 10-WORST PAIN EVER
PAINLEVEL_OUTOF10: 10 - WORST POSSIBLE PAIN
PAINLEVEL_OUTOF10: 0 - NO PAIN

## 2024-10-08 NOTE — PATIENT INSTRUCTIONS
Today :We administered ketamine 30 mg-lidocaine 300 mg, propofol, and ketorolac.     For:   1. Fibromyalgia       (Tell all doctors including dentists that you are taking this medication)     Go to the emergency room or call 911 if:  -You have signs of allergic reaction:   -Rash, hives, itching.   -Swollen, blistered, peeling skin.   -Swelling of face, lips, mouth, tongue or throat.   -Tightness of chest, trouble breathing, swallowing or talking     Call your doctor:  - If IV / injection site gets red, warm, swollen, itchy or leaks fluid or pus.     (Leave dressing on your IV site for at least 2 hours and keep area clean and dry  - If you get sick or have symptoms of infection or are not feeling well for any reason.    (Wash your hands often, stay away from people who are sick)  - If you have side effects from your medication that do not go away or are bothersome.     (Refer to the teaching your nurse gave you for side effects to call your doctor about)    - Common side effects may include:  stuffy nose, headache, feeling tired, muscle aches, upset stomach  - Before receiving any vaccines     - Call the [facility] at   If:  - You get sick, are on antibiotics, have had a recent vaccine, have surgery or dental work and your doctor wants your visit rescheduled.  - You need to cancel and reschedule your visit for any reason. Call at least 2 days before your visit if you need to cancel.   - Your insurance changes before your next visit.    (We will need to get approval from your new insurance. This can take up to two weeks.)     The [facility] is opened Monday thru Friday. We are closed on weekends and holidays.   Voice mail will take your call if the center is closed. If you leave a message please allow 24 hours for a call back during weekdays. If you leave a message on a weekend/holiday, we will call you back the next business day.              [facility] [place]      Pain Infusion  Aftercare Instructions      1. It is normal to feel sedated, tired and low in energy after a pain infusion. DO NOT DRIVE, OPERATE ANY MACHINERY, OR MAKE ANY IMPORTANT DECISIONS FOR AT LEAST 24 HOURS AFTER THE INFUSION.     2. Call the pain center at [phone] with any problems, questions, or concerns.     3. Eat light after the infusion. If you feel queasy or sick to your stomach, laying down with your eyes closed may help. When you resume eating start with something mild like clear liquids, yogurt, applesauce, crackers, etc… Gradually advance to a regular diet.     4. Do not leave your house alone the evening of your pain infusion.     5. No alcohol or sedative medications, such as sleeping pills, for 24 hours after your pain infusion.     6. Resume all other prescribed medications unless directed otherwise by you physician.     7. If you have any medical emergencies, call 911 or go directly to the closest emergency room.

## 2024-10-08 NOTE — PROGRESS NOTES
S: Patient here for 5th opioid sparing pain infusion. Patient reports 50% reduction in pain after last infusion that lasted 1 day.    Purpose of pain infusion meds explained along with potential side effects.  Patient verbalized understanding.    B: Pain Issues. Left shoulder, arm, middle of back    Is Patient breast feeding: N/A    A: Patient currently has pain described on flow sheet documentation. Designated  is [employer]. Patient last ate solid food >8 hours ago, and had liquid 1 hours ago.    R: Plan; Obtain IV access, do patient risk assessment, and start opioid sparing infusion as ordered. Monitoring for S/S of adverse reactions.    Post infusion teaching provided. Patient verbalized understanding. VSS, Patient states pain is 0/10. Will assist patient to waiting car via wheelchair.

## 2024-10-13 ENCOUNTER — HOSPITAL ENCOUNTER (EMERGENCY)
Facility: HOSPITAL | Age: 54
Discharge: HOME | End: 2024-10-13
Attending: EMERGENCY MEDICINE
Payer: COMMERCIAL

## 2024-10-13 VITALS
RESPIRATION RATE: 16 BRPM | HEART RATE: 74 BPM | SYSTOLIC BLOOD PRESSURE: 119 MMHG | HEIGHT: 65 IN | OXYGEN SATURATION: 99 % | BODY MASS INDEX: 29.99 KG/M2 | TEMPERATURE: 96.6 F | WEIGHT: 180 LBS | DIASTOLIC BLOOD PRESSURE: 81 MMHG

## 2024-10-13 DIAGNOSIS — Z00.00 ROUTINE MEDICAL EXAM: ICD-10-CM

## 2024-10-13 DIAGNOSIS — M25.512 ACUTE PAIN OF LEFT SHOULDER: Primary | ICD-10-CM

## 2024-10-13 LAB
ALBUMIN SERPL BCP-MCNC: 4.5 G/DL (ref 3.4–5)
ALP SERPL-CCNC: 34 U/L (ref 33–110)
ALT SERPL W P-5'-P-CCNC: 13 U/L (ref 7–45)
ANION GAP SERPL CALC-SCNC: 12 MMOL/L (ref 10–20)
AST SERPL W P-5'-P-CCNC: 15 U/L (ref 9–39)
BASOPHILS # BLD AUTO: 0.02 X10*3/UL (ref 0–0.1)
BASOPHILS NFR BLD AUTO: 0.1 %
BILIRUB SERPL-MCNC: 0.3 MG/DL (ref 0–1.2)
BUN SERPL-MCNC: 25 MG/DL (ref 6–23)
CALCIUM SERPL-MCNC: 9.5 MG/DL (ref 8.6–10.3)
CHLORIDE SERPL-SCNC: 106 MMOL/L (ref 98–107)
CO2 SERPL-SCNC: 23 MMOL/L (ref 21–32)
CREAT SERPL-MCNC: 0.95 MG/DL (ref 0.5–1.05)
CRP SERPL-MCNC: <0.1 MG/DL
EGFRCR SERPLBLD CKD-EPI 2021: 70 ML/MIN/1.73M*2
EOSINOPHIL # BLD AUTO: 0 X10*3/UL (ref 0–0.7)
EOSINOPHIL NFR BLD AUTO: 0 %
ERYTHROCYTE [DISTWIDTH] IN BLOOD BY AUTOMATED COUNT: 14 % (ref 11.5–14.5)
ERYTHROCYTE [SEDIMENTATION RATE] IN BLOOD BY WESTERGREN METHOD: 35 MM/H (ref 0–30)
GLUCOSE SERPL-MCNC: 107 MG/DL (ref 74–99)
HCT VFR BLD AUTO: 43.6 % (ref 36–46)
HGB BLD-MCNC: 15.2 G/DL (ref 12–16)
IMM GRANULOCYTES # BLD AUTO: 0.08 X10*3/UL (ref 0–0.7)
IMM GRANULOCYTES NFR BLD AUTO: 0.5 % (ref 0–0.9)
LYMPHOCYTES # BLD AUTO: 3.46 X10*3/UL (ref 1.2–4.8)
LYMPHOCYTES NFR BLD AUTO: 21.5 %
MCH RBC QN AUTO: 33.1 PG (ref 26–34)
MCHC RBC AUTO-ENTMCNC: 34.9 G/DL (ref 32–36)
MCV RBC AUTO: 95 FL (ref 80–100)
MONOCYTES # BLD AUTO: 0.95 X10*3/UL (ref 0.1–1)
MONOCYTES NFR BLD AUTO: 5.9 %
NEUTROPHILS # BLD AUTO: 11.58 X10*3/UL (ref 1.2–7.7)
NEUTROPHILS NFR BLD AUTO: 72 %
NRBC BLD-RTO: 0 /100 WBCS (ref 0–0)
PLATELET # BLD AUTO: 318 X10*3/UL (ref 150–450)
POTASSIUM SERPL-SCNC: 5 MMOL/L (ref 3.5–5.3)
PROT SERPL-MCNC: 7.8 G/DL (ref 6.4–8.2)
RBC # BLD AUTO: 4.59 X10*6/UL (ref 4–5.2)
SODIUM SERPL-SCNC: 136 MMOL/L (ref 136–145)
WBC # BLD AUTO: 16.1 X10*3/UL (ref 4.4–11.3)

## 2024-10-13 PROCEDURE — 80061 LIPID PANEL: CPT | Performed by: FAMILY MEDICINE

## 2024-10-13 PROCEDURE — 2500000004 HC RX 250 GENERAL PHARMACY W/ HCPCS (ALT 636 FOR OP/ED): Performed by: EMERGENCY MEDICINE

## 2024-10-13 PROCEDURE — 84443 ASSAY THYROID STIM HORMONE: CPT | Performed by: FAMILY MEDICINE

## 2024-10-13 PROCEDURE — 86140 C-REACTIVE PROTEIN: CPT | Performed by: EMERGENCY MEDICINE

## 2024-10-13 PROCEDURE — 83036 HEMOGLOBIN GLYCOSYLATED A1C: CPT | Performed by: FAMILY MEDICINE

## 2024-10-13 PROCEDURE — 36415 COLL VENOUS BLD VENIPUNCTURE: CPT | Performed by: EMERGENCY MEDICINE

## 2024-10-13 PROCEDURE — 99281 EMR DPT VST MAYX REQ PHY/QHP: CPT | Mod: 25

## 2024-10-13 PROCEDURE — 80053 COMPREHEN METABOLIC PANEL: CPT | Performed by: EMERGENCY MEDICINE

## 2024-10-13 PROCEDURE — 85025 COMPLETE CBC W/AUTO DIFF WBC: CPT | Performed by: EMERGENCY MEDICINE

## 2024-10-13 PROCEDURE — 85652 RBC SED RATE AUTOMATED: CPT | Performed by: EMERGENCY MEDICINE

## 2024-10-13 PROCEDURE — 96374 THER/PROPH/DIAG INJ IV PUSH: CPT

## 2024-10-13 PROCEDURE — 96372 THER/PROPH/DIAG INJ SC/IM: CPT | Performed by: EMERGENCY MEDICINE

## 2024-10-13 RX ORDER — KETOROLAC TROMETHAMINE 30 MG/ML
30 INJECTION, SOLUTION INTRAMUSCULAR; INTRAVENOUS ONCE
Status: COMPLETED | OUTPATIENT
Start: 2024-10-13 | End: 2024-10-13

## 2024-10-13 RX ORDER — KETOROLAC TROMETHAMINE 30 MG/ML
30 INJECTION, SOLUTION INTRAMUSCULAR; INTRAVENOUS ONCE
Status: DISCONTINUED | OUTPATIENT
Start: 2024-10-13 | End: 2024-10-13

## 2024-10-13 RX ORDER — ORPHENADRINE CITRATE 30 MG/ML
60 INJECTION INTRAMUSCULAR; INTRAVENOUS ONCE
Status: COMPLETED | OUTPATIENT
Start: 2024-10-13 | End: 2024-10-13

## 2024-10-13 RX ORDER — KETOROLAC TROMETHAMINE 10 MG/1
10 TABLET, FILM COATED ORAL EVERY 6 HOURS PRN
Qty: 20 TABLET | Refills: 0 | Status: SHIPPED | OUTPATIENT
Start: 2024-10-13 | End: 2024-10-14

## 2024-10-13 ASSESSMENT — PAIN - FUNCTIONAL ASSESSMENT
PAIN_FUNCTIONAL_ASSESSMENT: 0-10

## 2024-10-13 ASSESSMENT — PAIN DESCRIPTION - LOCATION: LOCATION: SHOULDER

## 2024-10-13 ASSESSMENT — COLUMBIA-SUICIDE SEVERITY RATING SCALE - C-SSRS
6. HAVE YOU EVER DONE ANYTHING, STARTED TO DO ANYTHING, OR PREPARED TO DO ANYTHING TO END YOUR LIFE?: NO
1. IN THE PAST MONTH, HAVE YOU WISHED YOU WERE DEAD OR WISHED YOU COULD GO TO SLEEP AND NOT WAKE UP?: NO
2. HAVE YOU ACTUALLY HAD ANY THOUGHTS OF KILLING YOURSELF?: NO

## 2024-10-13 ASSESSMENT — PAIN SCALES - GENERAL
PAINLEVEL_OUTOF10: 10 - WORST POSSIBLE PAIN
PAINLEVEL_OUTOF10: 4
PAINLEVEL_OUTOF10: 8

## 2024-10-13 ASSESSMENT — PAIN DESCRIPTION - ORIENTATION: ORIENTATION: LEFT
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
## 2024-10-14 RX ORDER — KETOROLAC TROMETHAMINE 10 MG/1
10 TABLET, FILM COATED ORAL EVERY 6 HOURS PRN
Qty: 20 TABLET | Refills: 0 | Status: SHIPPED | OUTPATIENT
Start: 2024-10-14 | End: 2024-10-19

## 2024-10-15 ENCOUNTER — OFFICE VISIT (OUTPATIENT)
Dept: PRIMARY CARE | Facility: CLINIC | Age: 54
End: 2024-10-15
Payer: COMMERCIAL

## 2024-10-15 VITALS
DIASTOLIC BLOOD PRESSURE: 62 MMHG | SYSTOLIC BLOOD PRESSURE: 105 MMHG | WEIGHT: 169 LBS | HEIGHT: 65 IN | BODY MASS INDEX: 28.16 KG/M2 | RESPIRATION RATE: 14 BRPM | HEART RATE: 68 BPM

## 2024-10-15 DIAGNOSIS — Z00.00 ROUTINE MEDICAL EXAM: Primary | ICD-10-CM

## 2024-10-15 PROBLEM — M79.662 PAIN AND SWELLING OF LOWER LEG, LEFT: Status: RESOLVED | Noted: 2024-02-28 | Resolved: 2024-10-15

## 2024-10-15 PROBLEM — L73.9 FOLLICULITIS: Status: RESOLVED | Noted: 2024-09-23 | Resolved: 2024-10-15

## 2024-10-15 PROBLEM — M79.89 PAIN AND SWELLING OF LOWER LEG, LEFT: Status: RESOLVED | Noted: 2024-02-28 | Resolved: 2024-10-15

## 2024-10-15 PROBLEM — B00.1 HERPES LABIALIS: Status: RESOLVED | Noted: 2023-08-14 | Resolved: 2024-10-15

## 2024-10-15 PROBLEM — L98.9 SKIN LESION OF LEFT ARM: Status: RESOLVED | Noted: 2024-09-23 | Resolved: 2024-10-15

## 2024-10-15 PROBLEM — M94.0 COSTOCHONDRITIS, ACUTE: Status: RESOLVED | Noted: 2023-09-27 | Resolved: 2024-10-15

## 2024-10-15 LAB
CHOLEST SERPL-MCNC: 184 MG/DL (ref 0–199)
CHOLESTEROL/HDL RATIO: 3.9
EST. AVERAGE GLUCOSE BLD GHB EST-MCNC: 114 MG/DL
HBA1C MFR BLD: 5.6 %
HDLC SERPL-MCNC: 46.8 MG/DL
LDLC SERPL CALC-MCNC: 87 MG/DL
NON HDL CHOLESTEROL: 137 MG/DL (ref 0–149)
TRIGL SERPL-MCNC: 249 MG/DL (ref 0–149)
TSH SERPL-ACNC: 0.77 MIU/L (ref 0.44–3.98)
VLDL: 50 MG/DL (ref 0–40)

## 2024-10-15 PROCEDURE — 99396 PREV VISIT EST AGE 40-64: CPT | Performed by: FAMILY MEDICINE

## 2024-10-15 PROCEDURE — 3008F BODY MASS INDEX DOCD: CPT | Performed by: FAMILY MEDICINE

## 2024-10-15 NOTE — PROGRESS NOTES
pt has regular dental visits. no vision problems. no hearing loss.   Lifestyle: healthy diet. no weight concerns. Pt exercises occ.   no  alcohol abuse.   Safety elements used: seat belt, smoke detector.   no passive smoke exposure, chemical abuse, domestic violence, anxiety symptoms, depression symptoms, pt has  safe driving habits, no driving violations,  no tuberculosis exposure.   The patient is postmenopausal. No vaginal bleeding. no menopausal symptoms. she is sexually active. safe sexual behavior. no bladder concerns.  no history of an abnormal pap smear.     Review of Systems  Constitutional: no chills, no fever and no night sweats.   Eyes: no eyesight problems.   ENT: no hearing loss, no nasal congestion, no nasal discharge, no hoarseness. no sore throat.   Neck: no mass(es) and no swelling.   Cardiovascular: no chest pain, no intermittent leg claudication, no lower extremity edema, no palpitations and no syncope.   Respiratory: no cough,  no shortness of breath.   Gastrointestinal: no abdominal pain, no constipation, no blood in stools, no diarrhea, no melena, no nausea,  vomiting.   Genitourinary: no dysuria, urinary urgency, urinary frequency, hematuria, urinary hesitancy, incontinence, nocturia,  no genital lesions, no local lump.   Musculoskeletal: + left shoulder and mid   back pain, no joint pain. no myalgias.   Integumentary: no new skin lesions, no rashes. no skin wound.   Neurological: no difficulty walking, no headache, no limb weakness, no numbness, no tingling.   Psychiatric: no anxiety,  no depression, no hi/si, no sleep disturbances.  no substance use disorders.   Endocrine: no changes in appetite or voice, no polyuria or polydypsia, no feelings of weakness  Hematologic/Lymphatic: no  easy bleeding, no easy bruising, no recurrent infections and no swollen glands.     Physical Exam  Constitutional: Alert and in no acute distress. Well developed, well nourished.   Head and Face: Normal.     Eyes: Normal external exam. Pupils: normal size and EOMI. No sclera icterus  Ears, Nose, Mouth, and Throat: External inspection of ears and nose: Normal.  Hearing: Normal.  Nasal mucosa: Normal.  Oropharynx: Normal.    Neck: Supple. No neck mass was observed. Thyroid not enlarged  Cardiovascular: Heart rate and rhythm were normal. Pedal pulses: Normal. No peripheral edema.   Pulmonary: No respiratory distress. Clear bilateral breath sounds.   Chest wall: Normal.    Abdomen: Soft,  nontender; no abdominal mass palpated. No organomegaly. Normal BS.  Musculoskeletal: Gait and station: Normal. No joint effusion, Muscle strength/tone: Normal.  mid back and left shoulder tenderness  Skin: Normal skin color and pigmentation, normal skin turgor,  no rash.   Neurologic: Cranial nerves 2-12 grossly intact.  Sensation: Normal.   Psychiatric: Judgment and insight: Intact. Alert and oriented x 3. Recent and remote memory: Normal.  Mood and affect: Normal.   Lymphatic: No cervical lymphadenopathy.     Unremarkable PE except overweight. recommend nutritionist eval. Recommend DASH diet and regular exercise. check  lipid, TSH.  Advise eye exam by an OD yearly and dental exam every 6 months. will monitor lipid and weight yearly. Recommend sunscreen application if exposed to the sun when UV index is above 2.  recommend Hep C, HepB, HIV test. recommend   shingle, hepB, flu, covid shot.  recommend colonoscopy,  Check cologuard.   We will call pt regarding lab results. f/u as scheduled.  DC smoking cigarettes. Recommend LDCT for lung cancer screening. Pt declined LDCT for lung cancer screening.   Recommend pap smear. Pt prefers to see her Gynecologist for evaluation.

## 2024-10-17 DIAGNOSIS — M79.7 FIBROMYALGIA: Primary | ICD-10-CM

## 2024-10-21 ENCOUNTER — APPOINTMENT (OUTPATIENT)
Dept: RADIOLOGY | Facility: HOSPITAL | Age: 54
End: 2024-10-21
Payer: COMMERCIAL

## 2024-10-21 ENCOUNTER — HOSPITAL ENCOUNTER (EMERGENCY)
Facility: HOSPITAL | Age: 54
Discharge: HOME | End: 2024-10-21
Payer: COMMERCIAL

## 2024-10-21 VITALS
TEMPERATURE: 96.8 F | OXYGEN SATURATION: 99 % | DIASTOLIC BLOOD PRESSURE: 78 MMHG | SYSTOLIC BLOOD PRESSURE: 120 MMHG | WEIGHT: 180 LBS | RESPIRATION RATE: 18 BRPM | BODY MASS INDEX: 29.99 KG/M2 | HEIGHT: 65 IN | HEART RATE: 80 BPM
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
## 2024-10-21 DIAGNOSIS — M25.561 ACUTE PAIN OF BOTH KNEES: ICD-10-CM

## 2024-10-21 DIAGNOSIS — M25.562 ACUTE PAIN OF BOTH KNEES: ICD-10-CM

## 2024-10-21 DIAGNOSIS — S00.83XA FACIAL HEMATOMA, INITIAL ENCOUNTER: ICD-10-CM

## 2024-10-21 PROCEDURE — 70450 CT HEAD/BRAIN W/O DYE: CPT | Performed by: RADIOLOGY

## 2024-10-21 PROCEDURE — 70486 CT MAXILLOFACIAL W/O DYE: CPT

## 2024-10-21 PROCEDURE — 70450 CT HEAD/BRAIN W/O DYE: CPT

## 2024-10-21 PROCEDURE — 72131 CT LUMBAR SPINE W/O DYE: CPT

## 2024-10-21 PROCEDURE — 72128 CT CHEST SPINE W/O DYE: CPT | Mod: FOREIGN READ | Performed by: RADIOLOGY

## 2024-10-21 PROCEDURE — 72131 CT LUMBAR SPINE W/O DYE: CPT | Mod: FOREIGN READ | Performed by: RADIOLOGY

## 2024-10-21 PROCEDURE — 73562 X-RAY EXAM OF KNEE 3: CPT | Mod: 50

## 2024-10-21 PROCEDURE — 72128 CT CHEST SPINE W/O DYE: CPT

## 2024-10-21 PROCEDURE — 99285 EMERGENCY DEPT VISIT HI MDM: CPT | Mod: 25

## 2024-10-21 PROCEDURE — 70486 CT MAXILLOFACIAL W/O DYE: CPT | Performed by: RADIOLOGY

## 2024-10-21 PROCEDURE — 2500000001 HC RX 250 WO HCPCS SELF ADMINISTERED DRUGS (ALT 637 FOR MEDICARE OP): Performed by: PHYSICIAN ASSISTANT

## 2024-10-21 PROCEDURE — 73562 X-RAY EXAM OF KNEE 3: CPT | Mod: BILATERAL PROCEDURE | Performed by: RADIOLOGY

## 2024-10-21 PROCEDURE — 71111 X-RAY EXAM RIBS/CHEST4/> VWS: CPT | Mod: FOREIGN READ | Performed by: RADIOLOGY

## 2024-10-21 PROCEDURE — 72125 CT NECK SPINE W/O DYE: CPT | Performed by: RADIOLOGY

## 2024-10-21 PROCEDURE — 76377 3D RENDER W/INTRP POSTPROCES: CPT | Performed by: RADIOLOGY

## 2024-10-21 PROCEDURE — 71111 X-RAY EXAM RIBS/CHEST4/> VWS: CPT

## 2024-10-21 PROCEDURE — 76377 3D RENDER W/INTRP POSTPROCES: CPT

## 2024-10-21 PROCEDURE — 72125 CT NECK SPINE W/O DYE: CPT

## 2024-10-21 RX ORDER — OXYCODONE AND ACETAMINOPHEN 5; 325 MG/1; MG/1
1 TABLET ORAL EVERY 6 HOURS PRN
Qty: 5 TABLET | Refills: 0 | Status: SHIPPED | OUTPATIENT
Start: 2024-10-21 | End: 2024-10-29 | Stop reason: WASHOUT

## 2024-10-21 RX ORDER — OXYCODONE AND ACETAMINOPHEN 5; 325 MG/1; MG/1
1 TABLET ORAL ONCE
Status: COMPLETED | OUTPATIENT
Start: 2024-10-21 | End: 2024-10-21

## 2024-10-21 ASSESSMENT — LIFESTYLE VARIABLES
HAVE YOU EVER FELT YOU SHOULD CUT DOWN ON YOUR DRINKING: NO
EVER HAD A DRINK FIRST THING IN THE MORNING TO STEADY YOUR NERVES TO GET RID OF A HANGOVER: NO
EVER FELT BAD OR GUILTY ABOUT YOUR DRINKING: NO
TOTAL SCORE: 0
HAVE PEOPLE ANNOYED YOU BY CRITICIZING YOUR DRINKING: NO

## 2024-10-21 ASSESSMENT — PAIN DESCRIPTION - DESCRIPTORS: DESCRIPTORS: ACHING

## 2024-10-21 ASSESSMENT — PAIN SCALES - GENERAL
PAINLEVEL_OUTOF10: 5 - MODERATE PAIN
PAINLEVEL_OUTOF10: 10 - WORST POSSIBLE PAIN

## 2024-10-21 ASSESSMENT — PAIN - FUNCTIONAL ASSESSMENT
PAIN_FUNCTIONAL_ASSESSMENT: 0-10
PAIN_FUNCTIONAL_ASSESSMENT: 0-10

## 2024-10-21 ASSESSMENT — PAIN DESCRIPTION - LOCATION: LOCATION: GENERALIZED
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
## 2024-10-21 NOTE — ED TRIAGE NOTES
Pt to ED with c/o L face pain/injury, back pain, hips and knees. Pt denies LOC, +hit head, -thinners. Pt states she had a mechanical fall and hit face on the cement. Reports current HA. Hx of back stimulator placement. Denies meds for pain pta.

## 2024-10-22 ENCOUNTER — INFUSION (OUTPATIENT)
Dept: INFUSION THERAPY | Facility: CLINIC | Age: 54
End: 2024-10-22
Payer: COMMERCIAL

## 2024-10-22 VITALS
OXYGEN SATURATION: 98 % | SYSTOLIC BLOOD PRESSURE: 117 MMHG | HEART RATE: 70 BPM | DIASTOLIC BLOOD PRESSURE: 79 MMHG | TEMPERATURE: 97.5 F | RESPIRATION RATE: 16 BRPM

## 2024-10-22 DIAGNOSIS — M79.7 FIBROMYALGIA: ICD-10-CM

## 2024-10-22 PROCEDURE — 96368 THER/DIAG CONCURRENT INF: CPT | Mod: INF

## 2024-10-22 PROCEDURE — 96365 THER/PROPH/DIAG IV INF INIT: CPT | Mod: INF

## 2024-10-22 PROCEDURE — 96375 TX/PRO/DX INJ NEW DRUG ADDON: CPT | Mod: INF

## 2024-10-22 PROCEDURE — 2500000004 HC RX 250 GENERAL PHARMACY W/ HCPCS (ALT 636 FOR OP/ED): Performed by: NURSE PRACTITIONER

## 2024-10-22 RX ORDER — DIPHENHYDRAMINE HYDROCHLORIDE 50 MG/ML
50 INJECTION INTRAMUSCULAR; INTRAVENOUS AS NEEDED
OUTPATIENT
Start: 2024-11-05

## 2024-10-22 RX ORDER — ONDANSETRON HYDROCHLORIDE 2 MG/ML
4 INJECTION, SOLUTION INTRAVENOUS ONCE
Status: COMPLETED | OUTPATIENT
Start: 2024-10-22 | End: 2024-10-22

## 2024-10-22 RX ORDER — ONDANSETRON HYDROCHLORIDE 2 MG/ML
4 INJECTION, SOLUTION INTRAVENOUS ONCE
OUTPATIENT
Start: 2024-11-05 | End: 2024-11-05

## 2024-10-22 RX ORDER — ALBUTEROL SULFATE 0.83 MG/ML
3 SOLUTION RESPIRATORY (INHALATION) AS NEEDED
OUTPATIENT
Start: 2024-11-05

## 2024-10-22 RX ORDER — NITROGLYCERIN 0.4 MG/1
0.4 TABLET SUBLINGUAL ONCE
OUTPATIENT
Start: 2024-11-05 | End: 2024-11-05

## 2024-10-22 RX ORDER — EPINEPHRINE 0.3 MG/.3ML
0.3 INJECTION SUBCUTANEOUS EVERY 5 MIN PRN
OUTPATIENT
Start: 2024-11-05

## 2024-10-22 RX ORDER — KETAMINE HCL IN NACL, ISO-OSM 100MG/10ML
SYRINGE (ML) INJECTION ONCE
Status: COMPLETED | OUTPATIENT
Start: 2024-10-22 | End: 2024-10-22

## 2024-10-22 RX ORDER — DIPHENHYDRAMINE HYDROCHLORIDE 50 MG/ML
25 INJECTION INTRAMUSCULAR; INTRAVENOUS ONCE
OUTPATIENT
Start: 2024-11-05 | End: 2024-11-05

## 2024-10-22 RX ORDER — KETOROLAC TROMETHAMINE 30 MG/ML
30 INJECTION, SOLUTION INTRAMUSCULAR; INTRAVENOUS ONCE
OUTPATIENT
Start: 2024-11-05 | End: 2024-11-05

## 2024-10-22 RX ORDER — FAMOTIDINE 10 MG/ML
20 INJECTION INTRAVENOUS ONCE AS NEEDED
OUTPATIENT
Start: 2024-11-05

## 2024-10-22 RX ORDER — KETAMINE HCL IN NACL, ISO-OSM 100MG/10ML
SYRINGE (ML) INJECTION ONCE
OUTPATIENT
Start: 2024-11-05

## 2024-10-22 RX ORDER — KETOROLAC TROMETHAMINE 30 MG/ML
30 INJECTION, SOLUTION INTRAMUSCULAR; INTRAVENOUS ONCE
Status: COMPLETED | OUTPATIENT
Start: 2024-10-22 | End: 2024-10-22

## 2024-10-22 ASSESSMENT — PAIN SCALES - GENERAL
PAINLEVEL_OUTOF10: 10 - WORST POSSIBLE PAIN
PAINLEVEL_OUTOF10: 2

## 2024-10-22 ASSESSMENT — ENCOUNTER SYMPTOMS
LOSS OF SENSATION IN FEET: 0
OCCASIONAL FEELINGS OF UNSTEADINESS: 1
DEPRESSION: 1

## 2024-10-22 NOTE — PATIENT INSTRUCTIONS
Today :We administered ketamine 30 mg-lidocaine 300 mg, propofol, and ketorolac.     For:   1. Fibromyalgia          (Tell all doctors including dentists that you are taking this medication)     Go to the emergency room or call 911 if:  -You have signs of allergic reaction:   -Rash, hives, itching.   -Swollen, blistered, peeling skin.   -Swelling of face, lips, mouth, tongue or throat.   -Tightness of chest, trouble breathing, swallowing or talking     Call your doctor:  - If IV / injection site gets red, warm, swollen, itchy or leaks fluid or pus.     (Leave dressing on your IV site for at least 2 hours and keep area clean and dry  - If you get sick or have symptoms of infection or are not feeling well for any reason.    (Wash your hands often, stay away from people who are sick)  - If you have side effects from your medication that do not go away or are bothersome.     (Refer to the teaching your nurse gave you for side effects to call your doctor about)    - Common side effects may include:  stuffy nose, headache, feeling tired, muscle aches, upset stomach  - Before receiving any vaccines     - Call the [facility] at   If:  - You get sick, are on antibiotics, have had a recent vaccine, have surgery or dental work and your doctor wants your visit rescheduled.  - You need to cancel and reschedule your visit for any reason. Call at least 2 days before your visit if you need to cancel.   - Your insurance changes before your next visit.    (We will need to get approval from your new insurance. This can take up to two weeks.)     The [facility] is opened Monday thru Friday. We are closed on weekends and holidays.   Voice mail will take your call if the center is closed. If you leave a message please allow 24 hours for a call back during weekdays. If you leave a message on a weekend/holiday, we will call you back the next business day.              [facility] [place]      Pain Infusion  Aftercare Instructions      1. It is normal to feel sedated, tired and low in energy after a pain infusion. DO NOT DRIVE, OPERATE ANY MACHINERY, OR MAKE ANY IMPORTANT DECISIONS FOR AT LEAST 24 HOURS AFTER THE INFUSION.     2. Call the pain center at [phone] with any problems, questions, or concerns.     3. Eat light after the infusion. If you feel queasy or sick to your stomach, laying down with your eyes closed may help. When you resume eating start with something mild like clear liquids, yogurt, applesauce, crackers, etc… Gradually advance to a regular diet.     4. Do not leave your house alone the evening of your pain infusion.     5. No alcohol or sedative medications, such as sleeping pills, for 24 hours after your pain infusion.     6. Resume all other prescribed medications unless directed otherwise by you physician.     7. If you have any medical emergencies, call 911 or go directly to the closest emergency room.

## 2024-10-22 NOTE — PROGRESS NOTES
S: Patient here for 6th opioid sparing pain infusion. Patient reports 40% reduction in pain after last infusion that lasted 1 day.    Purpose of pain infusion meds explained along with potential side effects.  Patient verbalized understanding.    B: Pain Issues in shoulder, back, legs, knees and head/eye    A: Patient currently has pain described on flow sheet documentation. Designated  is [employer] at [phone]. Patient last ate solid food >4 hours ago, and had liquid 1 hours ago.    R: Plan; Obtain IV access, do patient risk assessment, and start opioid sparing infusion as ordered. Monitoring for S/S of adverse reactions.    0952-Post infusion teaching provided. Patient verbalized understanding. VSS, Patient states pain is 2. Will assist patient to waiting car via wheelchair.

## 2024-10-23 ENCOUNTER — TELEPHONE (OUTPATIENT)
Dept: PAIN MEDICINE | Facility: CLINIC | Age: 54
End: 2024-10-23
Payer: COMMERCIAL

## 2024-10-23 DIAGNOSIS — G89.4 CHRONIC PAIN SYNDROME: Primary | ICD-10-CM

## 2024-10-23 DIAGNOSIS — M79.642 LEFT HAND PAIN: Primary | ICD-10-CM

## 2024-10-23 RX ORDER — OXYCODONE AND ACETAMINOPHEN 5; 325 MG/1; MG/1
1 TABLET ORAL EVERY 6 HOURS PRN
Qty: 7 TABLET | Refills: 0 | Status: SHIPPED | OUTPATIENT
Start: 2024-10-23 | End: 2024-10-26

## 2024-10-23 RX ORDER — NABUMETONE 500 MG/1
TABLET, FILM COATED ORAL
Qty: 90 TABLET | Refills: 11 | Status: SHIPPED | OUTPATIENT
Start: 2024-10-23

## 2024-10-28 ENCOUNTER — APPOINTMENT (OUTPATIENT)
Dept: PRIMARY CARE | Facility: CLINIC | Age: 54
End: 2024-10-28
Payer: COMMERCIAL

## 2024-10-28 DIAGNOSIS — M25.562 ACUTE PAIN OF BOTH KNEES: ICD-10-CM

## 2024-10-28 DIAGNOSIS — M25.561 ACUTE PAIN OF BOTH KNEES: ICD-10-CM

## 2024-10-28 DIAGNOSIS — B37.31 VAGINAL CANDIDIASIS: ICD-10-CM

## 2024-10-28 DIAGNOSIS — M54.50 ACUTE MIDLINE LOW BACK PAIN WITHOUT SCIATICA: Primary | ICD-10-CM

## 2024-10-28 PROCEDURE — 99214 OFFICE O/P EST MOD 30 MIN: CPT | Performed by: FAMILY MEDICINE

## 2024-10-28 RX ORDER — FLUCONAZOLE 150 MG/1
150 TABLET ORAL ONCE
Qty: 1 TABLET | Refills: 0 | Status: SHIPPED
Start: 2024-10-28 | End: 2024-10-29 | Stop reason: WASHOUT

## 2024-10-28 RX ORDER — KETOROLAC TROMETHAMINE 10 MG/1
10 TABLET, FILM COATED ORAL EVERY 8 HOURS PRN
Qty: 10 TABLET | Refills: 0 | Status: SHIPPED | OUTPATIENT
Start: 2024-10-28 | End: 2024-10-29 | Stop reason: SDUPTHER

## 2024-10-29 ENCOUNTER — TELEMEDICINE (OUTPATIENT)
Dept: PAIN MEDICINE | Facility: CLINIC | Age: 54
End: 2024-10-29
Payer: COMMERCIAL

## 2024-10-29 DIAGNOSIS — M25.562 ACUTE PAIN OF BOTH KNEES: ICD-10-CM

## 2024-10-29 DIAGNOSIS — G89.29 CHRONIC LEFT SHOULDER PAIN: Primary | ICD-10-CM

## 2024-10-29 DIAGNOSIS — M54.50 CHRONIC BILATERAL LOW BACK PAIN WITHOUT SCIATICA: ICD-10-CM

## 2024-10-29 DIAGNOSIS — M25.561 ACUTE PAIN OF BOTH KNEES: ICD-10-CM

## 2024-10-29 DIAGNOSIS — M54.50 ACUTE MIDLINE LOW BACK PAIN WITHOUT SCIATICA: ICD-10-CM

## 2024-10-29 DIAGNOSIS — G89.29 CHRONIC BILATERAL LOW BACK PAIN WITHOUT SCIATICA: ICD-10-CM

## 2024-10-29 DIAGNOSIS — M25.512 CHRONIC LEFT SHOULDER PAIN: Primary | ICD-10-CM

## 2024-10-29 DIAGNOSIS — M79.7 FIBROMYALGIA: ICD-10-CM

## 2024-10-29 DIAGNOSIS — M96.1 LUMBAR POST-LAMINECTOMY SYNDROME: ICD-10-CM

## 2024-10-29 RX ORDER — KETOROLAC TROMETHAMINE 30 MG/ML
30 INJECTION, SOLUTION INTRAMUSCULAR; INTRAVENOUS ONCE
OUTPATIENT
Start: 2024-11-07 | End: 2024-11-07

## 2024-10-29 RX ORDER — KETOROLAC TROMETHAMINE 10 MG/1
10 TABLET, FILM COATED ORAL EVERY 8 HOURS PRN
Qty: 30 TABLET | Refills: 0 | Status: SHIPPED | OUTPATIENT
Start: 2024-10-29 | End: 2024-11-08

## 2024-10-29 RX ORDER — KETAMINE HCL IN NACL, ISO-OSM 100MG/10ML
SYRINGE (ML) INJECTION ONCE
OUTPATIENT
Start: 2024-11-07

## 2024-10-29 ASSESSMENT — ENCOUNTER SYMPTOMS
BACK PAIN: 1
DIFFICULTY URINATING: 0
NUMBNESS: 0
PALPITATIONS: 0
SHORTNESS OF BREATH: 0
NECK PAIN: 1
CONSTIPATION: 0
CONFUSION: 0
FATIGUE: 0
NAUSEA: 1
HEADACHES: 1
CHILLS: 0
LOSS OF SENSATION IN FEET: 0
WEAKNESS: 0
CHEST TIGHTNESS: 0
FACIAL SWELLING: 1
MYALGIAS: 1
AGITATION: 0
FREQUENCY: 0
DIARRHEA: 0
OCCASIONAL FEELINGS OF UNSTEADINESS: 0

## 2024-10-29 ASSESSMENT — PAIN DESCRIPTION - DESCRIPTORS: DESCRIPTORS: ACHING;SHOOTING;SHARP

## 2024-10-29 ASSESSMENT — PAIN SCALES - GENERAL: PAINLEVEL_OUTOF10: 10 - WORST POSSIBLE PAIN

## 2024-10-29 ASSESSMENT — PAIN - FUNCTIONAL ASSESSMENT: PAIN_FUNCTIONAL_ASSESSMENT: 0-10

## 2024-11-07 ENCOUNTER — INFUSION (OUTPATIENT)
Dept: INFUSION THERAPY | Facility: CLINIC | Age: 54
End: 2024-11-07
Payer: COMMERCIAL

## 2024-11-07 VITALS
TEMPERATURE: 97.2 F | DIASTOLIC BLOOD PRESSURE: 90 MMHG | OXYGEN SATURATION: 99 % | RESPIRATION RATE: 14 BRPM | SYSTOLIC BLOOD PRESSURE: 147 MMHG | HEART RATE: 68 BPM

## 2024-11-07 DIAGNOSIS — M79.7 FIBROMYALGIA: ICD-10-CM

## 2024-11-07 PROCEDURE — 2500000004 HC RX 250 GENERAL PHARMACY W/ HCPCS (ALT 636 FOR OP/ED): Performed by: NURSE PRACTITIONER

## 2024-11-07 PROCEDURE — 96375 TX/PRO/DX INJ NEW DRUG ADDON: CPT | Mod: INF

## 2024-11-07 PROCEDURE — 96368 THER/DIAG CONCURRENT INF: CPT | Mod: INF

## 2024-11-07 PROCEDURE — 96365 THER/PROPH/DIAG IV INF INIT: CPT | Mod: INF

## 2024-11-07 RX ORDER — FAMOTIDINE 10 MG/ML
20 INJECTION INTRAVENOUS ONCE AS NEEDED
OUTPATIENT
Start: 2024-11-21

## 2024-11-07 RX ORDER — ONDANSETRON HYDROCHLORIDE 2 MG/ML
4 INJECTION, SOLUTION INTRAVENOUS ONCE
Status: COMPLETED | OUTPATIENT
Start: 2024-11-07 | End: 2024-11-07

## 2024-11-07 RX ORDER — DIPHENHYDRAMINE HYDROCHLORIDE 50 MG/ML
50 INJECTION INTRAMUSCULAR; INTRAVENOUS AS NEEDED
OUTPATIENT
Start: 2024-11-21

## 2024-11-07 RX ORDER — KETOROLAC TROMETHAMINE 30 MG/ML
30 INJECTION, SOLUTION INTRAMUSCULAR; INTRAVENOUS ONCE
OUTPATIENT
Start: 2024-11-21 | End: 2024-11-21

## 2024-11-07 RX ORDER — ONDANSETRON HYDROCHLORIDE 2 MG/ML
4 INJECTION, SOLUTION INTRAVENOUS ONCE
OUTPATIENT
Start: 2024-11-21 | End: 2024-11-21

## 2024-11-07 RX ORDER — DIPHENHYDRAMINE HYDROCHLORIDE 50 MG/ML
25 INJECTION INTRAMUSCULAR; INTRAVENOUS ONCE
OUTPATIENT
Start: 2024-11-21 | End: 2024-11-21

## 2024-11-07 RX ORDER — KETOROLAC TROMETHAMINE 30 MG/ML
30 INJECTION, SOLUTION INTRAMUSCULAR; INTRAVENOUS ONCE
Status: COMPLETED | OUTPATIENT
Start: 2024-11-07 | End: 2024-11-07

## 2024-11-07 RX ORDER — NITROGLYCERIN 0.4 MG/1
0.4 TABLET SUBLINGUAL ONCE
OUTPATIENT
Start: 2024-11-21 | End: 2024-11-21

## 2024-11-07 RX ORDER — KETAMINE HCL IN NACL, ISO-OSM 100MG/10ML
SYRINGE (ML) INJECTION ONCE
Status: COMPLETED | OUTPATIENT
Start: 2024-11-07 | End: 2024-11-07

## 2024-11-07 RX ORDER — KETAMINE HCL IN NACL, ISO-OSM 100MG/10ML
SYRINGE (ML) INJECTION ONCE
OUTPATIENT
Start: 2024-11-21

## 2024-11-07 RX ORDER — ALBUTEROL SULFATE 0.83 MG/ML
3 SOLUTION RESPIRATORY (INHALATION) AS NEEDED
OUTPATIENT
Start: 2024-11-21

## 2024-11-07 RX ORDER — EPINEPHRINE 0.3 MG/.3ML
0.3 INJECTION SUBCUTANEOUS EVERY 5 MIN PRN
OUTPATIENT
Start: 2024-11-21

## 2024-11-07 ASSESSMENT — ENCOUNTER SYMPTOMS
DEPRESSION: 1
LOSS OF SENSATION IN FEET: 0
OCCASIONAL FEELINGS OF UNSTEADINESS: 0

## 2024-11-07 ASSESSMENT — PAIN SCALES - GENERAL
PAINLEVEL_OUTOF10: 0 - NO PAIN
PAINLEVEL_OUTOF10: 9

## 2024-11-07 NOTE — PROGRESS NOTES
S: Patient here for 7th opioid sparing pain infusion. Patient reports 50% reduction in pain after last infusion that lasted 1 day.    Purpose of pain infusion meds explained along with potential side effects.  Patient verbalized understanding.    B: Pain Issues in left shoulder, left arm, middle of back and both knees    Is Patient breast feeding: no    A: Patient currently has pain described on flow sheet documentation. Designated  is [place], spouse at [phone]. Patient last ate solid food >4 hours ago, and had liquid 1 hours ago.    R: Plan; Obtain IV access, do patient risk assessment, and start opioid sparing infusion as ordered. Monitoring for S/S of adverse reactions.    0925- pts BP elevated, will continue to monitor.     0933-Post infusion teaching provided. Patient verbalized understanding. VSS, Patient states pain is 0. Will assist patient to waiting car via wheelchair.

## 2024-11-07 NOTE — PATIENT INSTRUCTIONS
Today :We administered ketamine 30 mg-lidocaine 300 mg, propofol, ketorolac, and ondansetron.     For:   1. Fibromyalgia          (Tell all doctors including dentists that you are taking this medication)     Go to the emergency room or call 911 if:  -You have signs of allergic reaction:   -Rash, hives, itching.   -Swollen, blistered, peeling skin.   -Swelling of face, lips, mouth, tongue or throat.   -Tightness of chest, trouble breathing, swallowing or talking     Call your doctor:  - If IV / injection site gets red, warm, swollen, itchy or leaks fluid or pus.     (Leave dressing on your IV site for at least 2 hours and keep area clean and dry  - If you get sick or have symptoms of infection or are not feeling well for any reason.    (Wash your hands often, stay away from people who are sick)  - If you have side effects from your medication that do not go away or are bothersome.     (Refer to the teaching your nurse gave you for side effects to call your doctor about)    - Common side effects may include:  stuffy nose, headache, feeling tired, muscle aches, upset stomach  - Before receiving any vaccines     - [facility] at   If:  - You get sick, are on antibiotics, have had a recent vaccine, have surgery or dental work and your doctor wants your visit rescheduled.  - You need to cancel and reschedule your visit for any reason. Call at least 2 days before your visit if you need to cancel.   - Your insurance changes before your next visit.    (We will need to get approval from your new insurance. This can take up to two weeks.)     [facility] is opened Monday thru Friday. We are closed on weekends and holidays.   Voice mail will take your call if the center is closed. If you leave a message please allow 24 hours for a call back during weekdays. If you leave a message on a weekend/holiday, we will call you back the next business day.    A pharmacist is available Monday - Friday from  8:30AM to 3:30PM to help answer any questions you may have about your prescriptions(s). Please call pharmacy at:    [facility]: [phone]  [facility]: [phone]  [facility]: [phone]              [facility]      Pain Infusion Aftercare Instructions      1. It is normal to feel sedated, tired and low in energy after a pain infusion. DO NOT DRIVE, OPERATE ANY MACHINERY, OR MAKE ANY IMPORTANT DECISIONS FOR AT LEAST 24 HOURS AFTER THE INFUSION.     2. Call the pain center at [phone] with any problems, questions, or concerns.     3. Eat light after the infusion. If you feel queasy or sick to your stomach, laying down with your eyes closed may help. When you resume eating start with something mild like clear liquids, yogurt, applesauce, crackers, etc… Gradually advance to a regular diet.     4. Do not leave your house alone the evening of your pain infusion.     5. No alcohol or sedative medications, such as sleeping pills, for 24 hours after your pain infusion.     6. Resume all other prescribed medications unless directed otherwise by you physician.     7. If you have any medical emergencies, call 911 or go directly to the closest emergency room.

## 2024-11-08 ENCOUNTER — TELEPHONE (OUTPATIENT)
Dept: PAIN MEDICINE | Facility: CLINIC | Age: 54
End: 2024-11-08
Payer: COMMERCIAL

## 2024-11-08 DIAGNOSIS — M79.7 FIBROMYALGIA: Primary | ICD-10-CM

## 2024-11-08 RX ORDER — CLONIDINE HYDROCHLORIDE 0.1 MG/1
TABLET ORAL
Qty: 60 TABLET | Refills: 1 | Status: SHIPPED | OUTPATIENT
Start: 2024-11-08

## 2024-11-22 ENCOUNTER — INFUSION (OUTPATIENT)
Dept: INFUSION THERAPY | Facility: CLINIC | Age: 54
End: 2024-11-22
Payer: COMMERCIAL

## 2024-11-22 VITALS
TEMPERATURE: 97.2 F | SYSTOLIC BLOOD PRESSURE: 109 MMHG | DIASTOLIC BLOOD PRESSURE: 67 MMHG | OXYGEN SATURATION: 95 % | HEART RATE: 76 BPM | RESPIRATION RATE: 17 BRPM

## 2024-11-22 DIAGNOSIS — M79.7 FIBROMYALGIA: ICD-10-CM

## 2024-11-22 PROCEDURE — 96365 THER/PROPH/DIAG IV INF INIT: CPT | Mod: INF

## 2024-11-22 PROCEDURE — 96368 THER/DIAG CONCURRENT INF: CPT | Mod: INF

## 2024-11-22 PROCEDURE — 2500000004 HC RX 250 GENERAL PHARMACY W/ HCPCS (ALT 636 FOR OP/ED): Performed by: NURSE PRACTITIONER

## 2024-11-22 PROCEDURE — 96375 TX/PRO/DX INJ NEW DRUG ADDON: CPT | Mod: INF

## 2024-11-22 RX ORDER — NITROGLYCERIN 0.4 MG/1
0.4 TABLET SUBLINGUAL ONCE
OUTPATIENT
Start: 2024-12-05 | End: 2024-12-05

## 2024-11-22 RX ORDER — FAMOTIDINE 10 MG/ML
20 INJECTION INTRAVENOUS ONCE AS NEEDED
OUTPATIENT
Start: 2024-12-05

## 2024-11-22 RX ORDER — EPINEPHRINE 0.3 MG/.3ML
0.3 INJECTION SUBCUTANEOUS EVERY 5 MIN PRN
OUTPATIENT
Start: 2024-12-05

## 2024-11-22 RX ORDER — DIPHENHYDRAMINE HYDROCHLORIDE 50 MG/ML
25 INJECTION INTRAMUSCULAR; INTRAVENOUS ONCE
OUTPATIENT
Start: 2024-12-05 | End: 2024-12-05

## 2024-11-22 RX ORDER — KETAMINE HCL IN NACL, ISO-OSM 100MG/10ML
SYRINGE (ML) INJECTION ONCE
Status: COMPLETED | OUTPATIENT
Start: 2024-11-22 | End: 2024-11-22

## 2024-11-22 RX ORDER — KETAMINE HCL IN NACL, ISO-OSM 100MG/10ML
SYRINGE (ML) INJECTION ONCE
OUTPATIENT
Start: 2024-12-05

## 2024-11-22 RX ORDER — DIPHENHYDRAMINE HYDROCHLORIDE 50 MG/ML
50 INJECTION INTRAMUSCULAR; INTRAVENOUS AS NEEDED
OUTPATIENT
Start: 2024-12-05

## 2024-11-22 RX ORDER — KETOROLAC TROMETHAMINE 30 MG/ML
30 INJECTION, SOLUTION INTRAMUSCULAR; INTRAVENOUS ONCE
Status: COMPLETED | OUTPATIENT
Start: 2024-11-22 | End: 2024-11-22

## 2024-11-22 RX ORDER — ALBUTEROL SULFATE 0.83 MG/ML
3 SOLUTION RESPIRATORY (INHALATION) AS NEEDED
OUTPATIENT
Start: 2024-12-05

## 2024-11-22 RX ORDER — ONDANSETRON HYDROCHLORIDE 2 MG/ML
4 INJECTION, SOLUTION INTRAVENOUS ONCE
Status: COMPLETED | OUTPATIENT
Start: 2024-11-22 | End: 2024-11-22

## 2024-11-22 RX ORDER — KETOROLAC TROMETHAMINE 30 MG/ML
30 INJECTION, SOLUTION INTRAMUSCULAR; INTRAVENOUS ONCE
OUTPATIENT
Start: 2024-12-05 | End: 2024-12-05

## 2024-11-22 RX ORDER — ONDANSETRON HYDROCHLORIDE 2 MG/ML
4 INJECTION, SOLUTION INTRAVENOUS ONCE
OUTPATIENT
Start: 2024-12-05 | End: 2024-12-05

## 2024-11-22 ASSESSMENT — PAIN DESCRIPTION - DESCRIPTORS: DESCRIPTORS: SHARP;ACHING

## 2024-11-22 ASSESSMENT — PAIN SCALES - GENERAL
PAINLEVEL_OUTOF10: 10 - WORST POSSIBLE PAIN
PAINLEVEL_OUTOF10: 10-WORST PAIN EVER
PAINLEVEL_OUTOF10: 9
PAINLEVEL_OUTOF10: 9

## 2024-11-22 ASSESSMENT — ENCOUNTER SYMPTOMS
DEPRESSION: 1
OCCASIONAL FEELINGS OF UNSTEADINESS: 0
LOSS OF SENSATION IN FEET: 0

## 2024-11-22 ASSESSMENT — PAIN - FUNCTIONAL ASSESSMENT
PAIN_FUNCTIONAL_ASSESSMENT: 0-10
PAIN_FUNCTIONAL_ASSESSMENT: 0-10

## 2024-11-22 NOTE — PATIENT INSTRUCTIONS
Today :We administered ketamine 30 mg-lidocaine 300 mg, propofol, ketorolac, and ondansetron.     For:   1. Fibromyalgia             Please read the  Medication Guide that was given to you and reviewed during todays visit.     (Tell all doctors including dentists that you are taking this medication)     Go to the emergency room or call 911 if:  -You have signs of allergic reaction:   -Rash, hives, itching.   -Swollen, blistered, peeling skin.   -Swelling of face, lips, mouth, tongue or throat.   -Tightness of chest, trouble breathing, swallowing or talking     Call your doctor:  - If IV / injection site gets red, warm, swollen, itchy or leaks fluid or pus.     (Leave dressing on your IV site for at least 2 hours and keep area clean and dry  - If you get sick or have symptoms of infection or are not feeling well for any reason.    (Wash your hands often, stay away from people who are sick)  - If you have side effects from your medication that do not go away or are bothersome.     (Refer to the teaching your nurse gave you for side effects to call your doctor about)    - Common side effects may include:  stuffy nose, headache, feeling tired, muscle aches, upset stomach  - Before receiving any vaccines     - Call [facility] at   If:  - You get sick, are on antibiotics, have had a recent vaccine, have surgery or dental work and your doctor wants your visit rescheduled.  - You need to cancel and reschedule your visit for any reason. Call at least 2 days before your visit if you need to cancel.   - Your insurance changes before your next visit.    (We will need to get approval from your new insurance. This can take up to two weeks.)     [facility] is opened Monday thru Friday. We are closed on weekends and holidays.   Voice mail will take your call if the center is closed. If you leave a message please allow 24 hours for a call back during weekdays. If you leave a message on a  weekend/holiday, we will call you back the next business day.    A pharmacist is available Monday - Friday from 8:30AM to 3:30PM to help answer any questions you may have about your prescriptions(s). Please call pharmacy at:    [facility]: [phone]  [facility]: [phone]  [facility]: [phone]              [facility]      Pain Infusion Aftercare Instructions      1. It is normal to feel sedated, tired and low in energy after a pain infusion. DO NOT DRIVE, OPERATE ANY MACHINERY, OR MAKE ANY IMPORTANT DECISIONS FOR AT LEAST 24 HOURS AFTER THE INFUSION.     2. Call the pain center at [phone] with any problems, questions, or concerns.     3. Eat light after the infusion. If you feel queasy or sick to your stomach, laying down with your eyes closed may help. When you resume eating start with something mild like clear liquids, yogurt, applesauce, crackers, etc… Gradually advance to a regular diet.     4. Do not leave your house alone the evening of your pain infusion.     5. No alcohol or sedative medications, such as sleeping pills, for 24 hours after your pain infusion.     6. Resume all other prescribed medications unless directed otherwise by you physician.     7. If you have any medical emergencies, call 911 or go directly to the closest emergency room.

## 2024-11-22 NOTE — PROGRESS NOTES
Post infusion teaching provided. Patient verbalized understanding. VSS, Patient states pain is 9/10 behind both thigh area, 3/10 left shoulder and zero pain in back.   Patient tolerated pain infusion well without difficulty except for nausea relieved with Zofran.

## 2024-11-22 NOTE — PROGRESS NOTES
S: Patient here for 8 opioid sparing pain infusion. Patient reports 70% reduction in pain after last infusion that lasted 2 days.    Purpose of pain infusion meds explained along with potential side effects.  Patient verbalized understanding.    B: Pain Issues.10/10 generalized Is Patient breast feeding: No    A: Patient currently has pain described on flow sheet documentation. Designated  is [employer] @ [phone]. Patient last ate solid food 4 hours ago, and had liquid 1 hours ago.    R: Plan; Obtain IV access, do patient risk assessment, and start opioid sparing infusion as ordered. Monitoring for S/S of adverse reactions.

## 2024-12-02 ENCOUNTER — TELEPHONE (OUTPATIENT)
Dept: PAIN MEDICINE | Facility: CLINIC | Age: 54
End: 2024-12-02
Payer: COMMERCIAL

## 2024-12-02 NOTE — TELEPHONE ENCOUNTER
I attempted to reach Ms. Cano to discuss message she sent to primary pain management.  This is second attempt to make contact.  Thank you,  Ashu Mukherjee CNP

## 2024-12-10 DIAGNOSIS — M79.7 FIBROMYALGIA: Primary | ICD-10-CM

## 2024-12-10 RX ORDER — KETOROLAC TROMETHAMINE 30 MG/ML
30 INJECTION, SOLUTION INTRAMUSCULAR; INTRAVENOUS ONCE
Status: CANCELLED | OUTPATIENT
Start: 2024-12-11 | End: 2024-12-11

## 2024-12-10 RX ORDER — KETAMINE HCL IN NACL, ISO-OSM 100MG/10ML
SYRINGE (ML) INJECTION ONCE
Status: CANCELLED | OUTPATIENT
Start: 2024-12-11

## 2024-12-11 ENCOUNTER — INFUSION (OUTPATIENT)
Dept: INFUSION THERAPY | Facility: CLINIC | Age: 54
End: 2024-12-11
Payer: COMMERCIAL

## 2024-12-11 VITALS
HEART RATE: 75 BPM | SYSTOLIC BLOOD PRESSURE: 123 MMHG | OXYGEN SATURATION: 98 % | DIASTOLIC BLOOD PRESSURE: 76 MMHG | RESPIRATION RATE: 18 BRPM | TEMPERATURE: 97.2 F

## 2024-12-11 DIAGNOSIS — M79.7 FIBROMYALGIA: ICD-10-CM

## 2024-12-11 PROCEDURE — 96365 THER/PROPH/DIAG IV INF INIT: CPT | Mod: INF

## 2024-12-11 PROCEDURE — 2500000004 HC RX 250 GENERAL PHARMACY W/ HCPCS (ALT 636 FOR OP/ED): Performed by: NURSE PRACTITIONER

## 2024-12-11 PROCEDURE — 96375 TX/PRO/DX INJ NEW DRUG ADDON: CPT | Mod: INF

## 2024-12-11 PROCEDURE — 96368 THER/DIAG CONCURRENT INF: CPT | Mod: INF

## 2024-12-11 RX ORDER — KETOROLAC TROMETHAMINE 30 MG/ML
30 INJECTION, SOLUTION INTRAMUSCULAR; INTRAVENOUS ONCE
OUTPATIENT
Start: 2024-12-25 | End: 2024-12-25

## 2024-12-11 RX ORDER — FAMOTIDINE 10 MG/ML
20 INJECTION INTRAVENOUS ONCE AS NEEDED
OUTPATIENT
Start: 2024-12-25

## 2024-12-11 RX ORDER — KETAMINE HCL IN NACL, ISO-OSM 100MG/10ML
SYRINGE (ML) INJECTION ONCE
Status: COMPLETED | OUTPATIENT
Start: 2024-12-11 | End: 2024-12-11

## 2024-12-11 RX ORDER — KETAMINE HCL IN NACL, ISO-OSM 100MG/10ML
SYRINGE (ML) INJECTION ONCE
OUTPATIENT
Start: 2024-12-25

## 2024-12-11 RX ORDER — DIPHENHYDRAMINE HYDROCHLORIDE 50 MG/ML
25 INJECTION INTRAMUSCULAR; INTRAVENOUS ONCE
OUTPATIENT
Start: 2024-12-25 | End: 2024-12-25

## 2024-12-11 RX ORDER — DIPHENHYDRAMINE HYDROCHLORIDE 50 MG/ML
50 INJECTION INTRAMUSCULAR; INTRAVENOUS AS NEEDED
OUTPATIENT
Start: 2024-12-25

## 2024-12-11 RX ORDER — EPINEPHRINE 0.3 MG/.3ML
0.3 INJECTION SUBCUTANEOUS EVERY 5 MIN PRN
OUTPATIENT
Start: 2024-12-25

## 2024-12-11 RX ORDER — ONDANSETRON HYDROCHLORIDE 2 MG/ML
4 INJECTION, SOLUTION INTRAVENOUS ONCE
OUTPATIENT
Start: 2024-12-25 | End: 2024-12-25

## 2024-12-11 RX ORDER — NITROGLYCERIN 0.4 MG/1
0.4 TABLET SUBLINGUAL ONCE
OUTPATIENT
Start: 2024-12-25 | End: 2024-12-25

## 2024-12-11 RX ORDER — KETOROLAC TROMETHAMINE 30 MG/ML
30 INJECTION, SOLUTION INTRAMUSCULAR; INTRAVENOUS ONCE
Status: COMPLETED | OUTPATIENT
Start: 2024-12-11 | End: 2024-12-11

## 2024-12-11 RX ORDER — ALBUTEROL SULFATE 0.83 MG/ML
3 SOLUTION RESPIRATORY (INHALATION) AS NEEDED
OUTPATIENT
Start: 2024-12-25

## 2024-12-11 ASSESSMENT — PAIN SCALES - GENERAL
PAINLEVEL_OUTOF10: 10 - WORST POSSIBLE PAIN
PAINLEVEL_OUTOF10: 0 - NO PAIN
PAINLEVEL_OUTOF10: 10-WORST PAIN EVER

## 2024-12-11 ASSESSMENT — ENCOUNTER SYMPTOMS
OCCASIONAL FEELINGS OF UNSTEADINESS: 0
DEPRESSION: 1
LOSS OF SENSATION IN FEET: 0

## 2024-12-11 NOTE — PATIENT INSTRUCTIONS
Today :We administered ketamine 30 mg-lidocaine 300 mg, propofol, and ketorolac.     For:   1. Fibromyalgia       (Tell all doctors including dentists that you are taking this medication)     Go to the emergency room or call 911 if:  -You have signs of allergic reaction:   -Rash, hives, itching.   -Swollen, blistered, peeling skin.   -Swelling of face, lips, mouth, tongue or throat.   -Tightness of chest, trouble breathing, swallowing or talking     Call your doctor:  - If IV / injection site gets red, warm, swollen, itchy or leaks fluid or pus.     (Leave dressing on your IV site for at least 2 hours and keep area clean and dry  - If you get sick or have symptoms of infection or are not feeling well for any reason.    (Wash your hands often, stay away from people who are sick)  - If you have side effects from your medication that do not go away or are bothersome.     (Refer to the teaching your nurse gave you for side effects to call your doctor about)    - Common side effects may include:  stuffy nose, headache, feeling tired, muscle aches, upset stomach  - Before receiving any vaccines     - Call [facility] at   If:  - You get sick, are on antibiotics, have had a recent vaccine, have surgery or dental work and your doctor wants your visit rescheduled.  - You need to cancel and reschedule your visit for any reason. Call at least 2 days before your visit if you need to cancel.   - Your insurance changes before your next visit.    (We will need to get approval from your new insurance. This can take up to two weeks.)     [facility] is opened Monday thru Friday. We are closed on weekends and holidays.   Voice mail will take your call if the center is closed. If you leave a message please allow 24 hours for a call back during weekdays. If you leave a message on a weekend/holiday, we will call you back the next business day.    A pharmacist is available Monday - Friday from 8:30AM to 3:30PM  to help answer any questions you may have about your prescriptions(s). Please call pharmacy at:    [facility]: [phone]  [facility]: [phone]  [facility]: [phone]              [facility]      Pain Infusion Aftercare Instructions      1. It is normal to feel sedated, tired and low in energy after a pain infusion. DO NOT DRIVE, OPERATE ANY MACHINERY, OR MAKE ANY IMPORTANT DECISIONS FOR AT LEAST 24 HOURS AFTER THE INFUSION.     2. Call the pain center at [phone] with any problems, questions, or concerns.     3. Eat light after the infusion. If you feel queasy or sick to your stomach, laying down with your eyes closed may help. When you resume eating start with something mild like clear liquids, yogurt, applesauce, crackers, etc… Gradually advance to a regular diet.     4. Do not leave your house alone the evening of your pain infusion.     5. No alcohol or sedative medications, such as sleeping pills, for 24 hours after your pain infusion.     6. Resume all other prescribed medications unless directed otherwise by you physician.     7. If you have any medical emergencies, call 911 or go directly to the closest emergency room.

## 2024-12-11 NOTE — PROGRESS NOTES
S: Patient here for 9th opioid sparing pain infusion. Patient reports 40% reduction in pain after last infusion that lasted 1 day.    Purpose of pain infusion meds explained along with potential side effects.  Patient verbalized understanding.    B: Pain Issues. Middle back, left arm, left shoulder, head/eye, both knees    Is Patient breast feeding: no    A: Patient currently has pain described on flow sheet documentation. Designated  is [employer]. Patient last ate solid food >12 hours ago, and had liquid 1 hours ago.    R: Plan; Obtain IV access, do patient risk assessment, and start opioid sparing infusion as ordered. Monitoring for S/S of adverse reactions.    Post infusion teaching provided. Patient verbalized understanding. VSS, Patient states pain is 0/10. Will assist patient to waiting car via wheelchair.

## 2024-12-16 ENCOUNTER — TELEPHONE (OUTPATIENT)
Dept: PAIN MEDICINE | Facility: CLINIC | Age: 54
End: 2024-12-16
Payer: COMMERCIAL

## 2024-12-16 NOTE — TELEPHONE ENCOUNTER
I attempted to call Ms. Cano twice, left a voicemail encouraging patient to call back as soon as possible also she can call to schedule an appointment to discuss symptom management plan of care.  Ashu Mukherjee CNP

## 2024-12-19 DIAGNOSIS — M96.1 LUMBAR POST-LAMINECTOMY SYNDROME: ICD-10-CM

## 2024-12-19 DIAGNOSIS — M79.7 FIBROMYALGIA: Primary | ICD-10-CM

## 2024-12-19 RX ORDER — PREDNISONE 10 MG/1
TABLET ORAL
Qty: 64 TABLET | Refills: 0 | Status: SHIPPED | OUTPATIENT
Start: 2024-12-19

## 2024-12-30 DIAGNOSIS — M79.7 FIBROMYALGIA: Primary | ICD-10-CM

## 2024-12-30 RX ORDER — KETAMINE HCL IN NACL, ISO-OSM 100MG/10ML
SYRINGE (ML) INJECTION ONCE
OUTPATIENT
Start: 2025-01-03

## 2024-12-30 RX ORDER — KETOROLAC TROMETHAMINE 30 MG/ML
30 INJECTION, SOLUTION INTRAMUSCULAR; INTRAVENOUS ONCE
OUTPATIENT
Start: 2025-01-03 | End: 2025-01-03

## 2024-12-31 ENCOUNTER — APPOINTMENT (OUTPATIENT)
Dept: INFUSION THERAPY | Facility: CLINIC | Age: 54
End: 2024-12-31
Payer: COMMERCIAL

## 2025-01-02 ENCOUNTER — TELEPHONE (OUTPATIENT)
Dept: PAIN MEDICINE | Facility: CLINIC | Age: 55
End: 2025-01-02
Payer: COMMERCIAL

## 2025-01-07 DIAGNOSIS — M79.7 FIBROMYALGIA: ICD-10-CM

## 2025-01-07 DIAGNOSIS — M96.1 LUMBAR POST-LAMINECTOMY SYNDROME: Primary | ICD-10-CM

## 2025-01-07 RX ORDER — OXYCODONE AND ACETAMINOPHEN 5; 325 MG/1; MG/1
1 TABLET ORAL 2 TIMES DAILY PRN
Qty: 14 TABLET | Refills: 0 | Status: SHIPPED | OUTPATIENT
Start: 2025-01-07 | End: 2025-01-14

## 2025-01-07 NOTE — PROGRESS NOTES
I talked to Ms. Cano she continues to have neck pain rating to left shoulder and lower back pain rating to bilateral lower extremities she reports soreness and painful feeling like she has been working in her legs all day it is painful to move.  She reports 12 out of 10 pain.  She missed her January 3 IV infusion therapy which usually provides significant relief of her flareups.  She is using Flexeril, Tylenol and nabumetone provide mild relief of symptoms.  This usually provides more relief however the pain is increased and not providing relief.  This is negatively affecting her ADLs and her sleep.  Provided Percocet 7-day supply for severe breakthrough pain relief.  Discussed this is not intended for long-term use.  We also scheduled her for her next IV infusion therapy.  All questions and concerns answered.  Plan to follow-up as scheduled.  Thank you,  Ashu ALCANTARA

## 2025-01-08 DIAGNOSIS — M79.7 FIBROMYALGIA: Primary | ICD-10-CM

## 2025-01-08 RX ORDER — KETOROLAC TROMETHAMINE 30 MG/ML
30 INJECTION, SOLUTION INTRAMUSCULAR; INTRAVENOUS ONCE
Status: CANCELLED | OUTPATIENT
Start: 2025-01-09 | End: 2025-01-09

## 2025-01-08 RX ORDER — KETAMINE HCL IN NACL, ISO-OSM 100MG/10ML
SYRINGE (ML) INJECTION ONCE
Status: CANCELLED | OUTPATIENT
Start: 2025-01-09

## 2025-01-09 ENCOUNTER — INFUSION (OUTPATIENT)
Dept: INFUSION THERAPY | Facility: CLINIC | Age: 55
End: 2025-01-09
Payer: COMMERCIAL

## 2025-01-09 VITALS
TEMPERATURE: 97.5 F | RESPIRATION RATE: 17 BRPM | HEART RATE: 83 BPM | OXYGEN SATURATION: 94 % | DIASTOLIC BLOOD PRESSURE: 62 MMHG | SYSTOLIC BLOOD PRESSURE: 111 MMHG

## 2025-01-09 DIAGNOSIS — M79.7 FIBROMYALGIA: ICD-10-CM

## 2025-01-09 PROCEDURE — 96365 THER/PROPH/DIAG IV INF INIT: CPT | Mod: INF

## 2025-01-09 PROCEDURE — 2500000004 HC RX 250 GENERAL PHARMACY W/ HCPCS (ALT 636 FOR OP/ED): Performed by: NURSE PRACTITIONER

## 2025-01-09 PROCEDURE — 96368 THER/DIAG CONCURRENT INF: CPT | Mod: INF

## 2025-01-09 PROCEDURE — 96375 TX/PRO/DX INJ NEW DRUG ADDON: CPT | Mod: INF

## 2025-01-09 RX ORDER — KETAMINE HCL IN NACL, ISO-OSM 100MG/10ML
SYRINGE (ML) INJECTION ONCE
OUTPATIENT
Start: 2025-01-23

## 2025-01-09 RX ORDER — FAMOTIDINE 10 MG/ML
20 INJECTION INTRAVENOUS ONCE AS NEEDED
OUTPATIENT
Start: 2025-01-23

## 2025-01-09 RX ORDER — NITROGLYCERIN 0.4 MG/1
0.4 TABLET SUBLINGUAL ONCE
OUTPATIENT
Start: 2025-01-23 | End: 2025-01-23

## 2025-01-09 RX ORDER — DIPHENHYDRAMINE HYDROCHLORIDE 50 MG/ML
50 INJECTION INTRAMUSCULAR; INTRAVENOUS AS NEEDED
OUTPATIENT
Start: 2025-01-23

## 2025-01-09 RX ORDER — ALBUTEROL SULFATE 0.83 MG/ML
3 SOLUTION RESPIRATORY (INHALATION) AS NEEDED
OUTPATIENT
Start: 2025-01-23

## 2025-01-09 RX ORDER — DIPHENHYDRAMINE HYDROCHLORIDE 50 MG/ML
25 INJECTION INTRAMUSCULAR; INTRAVENOUS ONCE
OUTPATIENT
Start: 2025-01-23 | End: 2025-01-23

## 2025-01-09 RX ORDER — KETOROLAC TROMETHAMINE 30 MG/ML
30 INJECTION, SOLUTION INTRAMUSCULAR; INTRAVENOUS ONCE
OUTPATIENT
Start: 2025-01-23 | End: 2025-01-23

## 2025-01-09 RX ORDER — KETAMINE HCL IN NACL, ISO-OSM 100MG/10ML
SYRINGE (ML) INJECTION ONCE
Status: COMPLETED | OUTPATIENT
Start: 2025-01-09 | End: 2025-01-09

## 2025-01-09 RX ORDER — EPINEPHRINE 0.3 MG/.3ML
0.3 INJECTION SUBCUTANEOUS EVERY 5 MIN PRN
OUTPATIENT
Start: 2025-01-23

## 2025-01-09 RX ORDER — ONDANSETRON HYDROCHLORIDE 2 MG/ML
4 INJECTION, SOLUTION INTRAVENOUS ONCE
OUTPATIENT
Start: 2025-01-23 | End: 2025-01-23

## 2025-01-09 RX ORDER — KETOROLAC TROMETHAMINE 30 MG/ML
30 INJECTION, SOLUTION INTRAMUSCULAR; INTRAVENOUS ONCE
Status: COMPLETED | OUTPATIENT
Start: 2025-01-09 | End: 2025-01-09

## 2025-01-09 RX ADMIN — Medication: at 13:54

## 2025-01-09 RX ADMIN — KETOROLAC TROMETHAMINE 30 MG: 30 INJECTION, SOLUTION INTRAMUSCULAR at 13:53

## 2025-01-09 RX ADMIN — PROPOFOL 100 MG: 10 INJECTION, EMULSION INTRAVENOUS at 13:54

## 2025-01-09 ASSESSMENT — PAIN SCALES - GENERAL
PAINLEVEL_OUTOF10: 2
PAINLEVEL_OUTOF10: 10 - WORST POSSIBLE PAIN

## 2025-01-09 ASSESSMENT — ENCOUNTER SYMPTOMS
LOSS OF SENSATION IN FEET: 0
OCCASIONAL FEELINGS OF UNSTEADINESS: 0
DEPRESSION: 1

## 2025-01-09 NOTE — PATIENT INSTRUCTIONS
Today :We administered ketamine 30 mg-lidocaine 300 mg, propofol (Diprivan), and ketorolac.     For:   1. Fibromyalgia          (Tell all doctors including dentists that you are taking this medication)     Go to the emergency room or call 911 if:  -You have signs of allergic reaction:   -Rash, hives, itching.   -Swollen, blistered, peeling skin.   -Swelling of face, lips, mouth, tongue or throat.   -Tightness of chest, trouble breathing, swallowing or talking     Call your doctor:  - If IV / injection site gets red, warm, swollen, itchy or leaks fluid or pus.     (Leave dressing on your IV site for at least 2 hours and keep area clean and dry  - If you get sick or have symptoms of infection or are not feeling well for any reason.    (Wash your hands often, stay away from people who are sick)  - If you have side effects from your medication that do not go away or are bothersome.     (Refer to the teaching your nurse gave you for side effects to call your doctor about)    - Common side effects may include:  stuffy nose, headache, feeling tired, muscle aches, upset stomach  - Before receiving any vaccines     - [facility] at   If:  - You get sick, are on antibiotics, have had a recent vaccine, have surgery or dental work and your doctor wants your visit rescheduled.  - You need to cancel and reschedule your visit for any reason. Call at least 2 days before your visit if you need to cancel.   - Your insurance changes before your next visit.    (We will need to get approval from your new insurance. This can take up to two weeks.)     [facility] is opened Monday thru Friday. We are closed on weekends and holidays.   Voice mail will take your call if the center is closed. If you leave a message please allow 24 hours for a call back during weekdays. If you leave a message on a weekend/holiday, we will call you back the next business day.    A pharmacist is available Monday - Friday from  8:30AM to 3:30PM to help answer any questions you may have about your prescriptions(s). Please call pharmacy at:    [facility]: [phone]  [facility]: [phone]  [facility]: [phone]              [facility]      Pain Infusion Aftercare Instructions      1. It is normal to feel sedated, tired and low in energy after a pain infusion. DO NOT DRIVE, OPERATE ANY MACHINERY, OR MAKE ANY IMPORTANT DECISIONS FOR AT LEAST 24 HOURS AFTER THE INFUSION.     2. Call the pain center at [phone] with any problems, questions, or concerns.     3. Eat light after the infusion. If you feel queasy or sick to your stomach, laying down with your eyes closed may help. When you resume eating start with something mild like clear liquids, yogurt, applesauce, crackers, etc… Gradually advance to a regular diet.     4. Do not leave your house alone the evening of your pain infusion.     5. No alcohol or sedative medications, such as sleeping pills, for 24 hours after your pain infusion.     6. Resume all other prescribed medications unless directed otherwise by you physician.     7. If you have any medical emergencies, call 911 or go directly to the closest emergency room.

## 2025-01-09 NOTE — PROGRESS NOTES
S: Patient here for 10th opioid sparing pain infusion. Patient reports 60% reduction in pain after last infusion that lasted 1 day .    Purpose of pain infusion meds explained along with potential side effects.  Patient verbalized understanding.    B: Pain Issues in shoulder, back, legs and left side of forehead   Is Patient breast feeding: no    A: Patient currently has pain described on flow sheet documentation. Designated  is [employer] at [phone]. Patient last ate solid food >4 hours ago, and had liquid 1 hours ago.    R: Plan; Obtain IV access, do patient risk assessment, and start opioid sparing infusion as ordered. Monitoring for S/S of adverse reactions.    1443- Post infusion teaching provided. Patient verbalized understanding. VSS, Patient states pain is 2. Will assist patient to waiting car via wheelchair.

## 2025-01-17 DIAGNOSIS — G89.29 CHRONIC BILATERAL LOW BACK PAIN WITHOUT SCIATICA: ICD-10-CM

## 2025-01-17 DIAGNOSIS — F51.04 CHRONIC INSOMNIA: ICD-10-CM

## 2025-01-17 DIAGNOSIS — M54.50 CHRONIC BILATERAL LOW BACK PAIN WITHOUT SCIATICA: ICD-10-CM

## 2025-01-17 RX ORDER — TRAZODONE HYDROCHLORIDE 50 MG/1
50 TABLET ORAL NIGHTLY
Qty: 30 TABLET | Refills: 0 | Status: SHIPPED | OUTPATIENT
Start: 2025-01-17

## 2025-01-17 RX ORDER — CYCLOBENZAPRINE HCL 10 MG
10 TABLET ORAL DAILY
Qty: 60 TABLET | Refills: 0 | Status: SHIPPED | OUTPATIENT
Start: 2025-01-17

## 2025-01-20 ENCOUNTER — TELEMEDICINE (OUTPATIENT)
Dept: PAIN MEDICINE | Facility: CLINIC | Age: 55
End: 2025-01-20
Payer: COMMERCIAL

## 2025-01-20 DIAGNOSIS — M25.512 CHRONIC LEFT SHOULDER PAIN: Primary | ICD-10-CM

## 2025-01-20 DIAGNOSIS — M54.2 CERVICAL PAIN (NECK): ICD-10-CM

## 2025-01-20 DIAGNOSIS — G89.29 CHRONIC LEFT SHOULDER PAIN: Primary | ICD-10-CM

## 2025-01-20 DIAGNOSIS — M96.1 LUMBAR POST-LAMINECTOMY SYNDROME: ICD-10-CM

## 2025-01-20 DIAGNOSIS — M79.7 FIBROMYALGIA: ICD-10-CM

## 2025-01-20 PROCEDURE — 99215 OFFICE O/P EST HI 40 MIN: CPT | Performed by: NURSE PRACTITIONER

## 2025-01-20 PROCEDURE — 99417 PROLNG OP E/M EACH 15 MIN: CPT | Performed by: NURSE PRACTITIONER

## 2025-01-20 ASSESSMENT — ENCOUNTER SYMPTOMS
PALPITATIONS: 0
AGITATION: 0
ARTHRALGIAS: 1
WEAKNESS: 0
SHORTNESS OF BREATH: 0
LOSS OF SENSATION IN FEET: 0
NUMBNESS: 0
OCCASIONAL FEELINGS OF UNSTEADINESS: 0
HEADACHES: 1
DIZZINESS: 0
CHILLS: 0
DIARRHEA: 0
WHEEZING: 0
CONFUSION: 0
BACK PAIN: 1
CHEST TIGHTNESS: 0
DEPRESSION: 1
NAUSEA: 0
FREQUENCY: 0
DIFFICULTY URINATING: 0
FATIGUE: 0
CONSTIPATION: 0
NECK PAIN: 1
MYALGIAS: 1

## 2025-01-20 ASSESSMENT — PAIN SCALES - GENERAL: PAINLEVEL_OUTOF10: 10 - WORST POSSIBLE PAIN

## 2025-01-20 ASSESSMENT — PAIN DESCRIPTION - DESCRIPTORS: DESCRIPTORS: SHOOTING;SHARP

## 2025-01-20 ASSESSMENT — PAIN - FUNCTIONAL ASSESSMENT: PAIN_FUNCTIONAL_ASSESSMENT: 0-10
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
## 2025-01-22 ENCOUNTER — APPOINTMENT (OUTPATIENT)
Dept: INFUSION THERAPY | Facility: CLINIC | Age: 55
End: 2025-01-22
Payer: COMMERCIAL

## 2025-01-23 DIAGNOSIS — M79.7 FIBROMYALGIA: Primary | ICD-10-CM

## 2025-01-27 ENCOUNTER — INFUSION (OUTPATIENT)
Dept: INFUSION THERAPY | Facility: CLINIC | Age: 55
End: 2025-01-27
Payer: COMMERCIAL

## 2025-01-27 ENCOUNTER — TELEPHONE (OUTPATIENT)
Dept: PRIMARY CARE | Facility: CLINIC | Age: 55
End: 2025-01-27

## 2025-01-27 VITALS
HEART RATE: 83 BPM | RESPIRATION RATE: 16 BRPM | DIASTOLIC BLOOD PRESSURE: 67 MMHG | TEMPERATURE: 96.8 F | OXYGEN SATURATION: 96 % | SYSTOLIC BLOOD PRESSURE: 97 MMHG

## 2025-01-27 DIAGNOSIS — M79.7 FIBROMYALGIA: ICD-10-CM

## 2025-01-27 PROCEDURE — 2500000004 HC RX 250 GENERAL PHARMACY W/ HCPCS (ALT 636 FOR OP/ED): Performed by: NURSE PRACTITIONER

## 2025-01-27 PROCEDURE — 96375 TX/PRO/DX INJ NEW DRUG ADDON: CPT | Mod: INF

## 2025-01-27 PROCEDURE — 96365 THER/PROPH/DIAG IV INF INIT: CPT | Mod: INF

## 2025-01-27 PROCEDURE — 96368 THER/DIAG CONCURRENT INF: CPT | Mod: INF

## 2025-01-27 RX ORDER — KETOROLAC TROMETHAMINE 30 MG/ML
30 INJECTION, SOLUTION INTRAMUSCULAR; INTRAVENOUS ONCE
Status: COMPLETED | OUTPATIENT
Start: 2025-01-27 | End: 2025-01-27

## 2025-01-27 RX ORDER — DIPHENHYDRAMINE HYDROCHLORIDE 50 MG/ML
50 INJECTION INTRAMUSCULAR; INTRAVENOUS AS NEEDED
OUTPATIENT
Start: 2025-02-06

## 2025-01-27 RX ORDER — KETAMINE HCL IN NACL, ISO-OSM 100MG/10ML
SYRINGE (ML) INJECTION ONCE
Status: COMPLETED | OUTPATIENT
Start: 2025-01-27 | End: 2025-01-27

## 2025-01-27 RX ORDER — KETOROLAC TROMETHAMINE 10 MG/1
TABLET, FILM COATED ORAL
Qty: 10 TABLET | Refills: 0 | OUTPATIENT
Start: 2025-01-27

## 2025-01-27 RX ORDER — DIPHENHYDRAMINE HYDROCHLORIDE 50 MG/ML
25 INJECTION INTRAMUSCULAR; INTRAVENOUS ONCE
OUTPATIENT
Start: 2025-02-06 | End: 2025-02-06

## 2025-01-27 RX ORDER — ONDANSETRON HYDROCHLORIDE 2 MG/ML
4 INJECTION, SOLUTION INTRAVENOUS ONCE
Status: COMPLETED | OUTPATIENT
Start: 2025-01-27 | End: 2025-01-27

## 2025-01-27 RX ORDER — EPINEPHRINE 0.3 MG/.3ML
0.3 INJECTION SUBCUTANEOUS EVERY 5 MIN PRN
OUTPATIENT
Start: 2025-02-06

## 2025-01-27 RX ORDER — ONDANSETRON HYDROCHLORIDE 2 MG/ML
4 INJECTION, SOLUTION INTRAVENOUS ONCE
OUTPATIENT
Start: 2025-02-06 | End: 2025-02-06

## 2025-01-27 RX ORDER — ALBUTEROL SULFATE 0.83 MG/ML
3 SOLUTION RESPIRATORY (INHALATION) AS NEEDED
OUTPATIENT
Start: 2025-02-06

## 2025-01-27 RX ORDER — KETOROLAC TROMETHAMINE 30 MG/ML
30 INJECTION, SOLUTION INTRAMUSCULAR; INTRAVENOUS ONCE
OUTPATIENT
Start: 2025-02-06 | End: 2025-02-06

## 2025-01-27 RX ORDER — FAMOTIDINE 10 MG/ML
20 INJECTION INTRAVENOUS ONCE AS NEEDED
OUTPATIENT
Start: 2025-02-06

## 2025-01-27 RX ORDER — KETAMINE HCL IN NACL, ISO-OSM 100MG/10ML
SYRINGE (ML) INJECTION ONCE
OUTPATIENT
Start: 2025-02-06

## 2025-01-27 RX ORDER — NITROGLYCERIN 0.4 MG/1
0.4 TABLET SUBLINGUAL ONCE
OUTPATIENT
Start: 2025-02-06 | End: 2025-02-06

## 2025-01-27 RX ADMIN — PROPOFOL 100 MG: 10 INJECTION, EMULSION INTRAVENOUS at 08:00

## 2025-01-27 RX ADMIN — KETOROLAC TROMETHAMINE 30 MG: 30 INJECTION, SOLUTION INTRAMUSCULAR at 07:59

## 2025-01-27 RX ADMIN — Medication: at 08:00

## 2025-01-27 RX ADMIN — ONDANSETRON 4 MG: 2 INJECTION INTRAMUSCULAR; INTRAVENOUS at 08:00

## 2025-01-27 ASSESSMENT — ENCOUNTER SYMPTOMS
LOSS OF SENSATION IN FEET: 0
DEPRESSION: 1
OCCASIONAL FEELINGS OF UNSTEADINESS: 1

## 2025-01-27 ASSESSMENT — PAIN SCALES - GENERAL
PAINLEVEL_OUTOF10: 0 - NO PAIN
PAINLEVEL_OUTOF10: 10 - WORST POSSIBLE PAIN
PAINLEVEL_OUTOF10: 10-WORST PAIN EVER

## 2025-01-27 NOTE — PATIENT INSTRUCTIONS
Today :We administered ondansetron, ketamine 30 mg-lidocaine 300 mg, propofol (Diprivan), and ketorolac.     For:   1. Fibromyalgia       (Tell all doctors including dentists that you are taking this medication)     Go to the emergency room or call 911 if:  -You have signs of allergic reaction:   -Rash, hives, itching.   -Swollen, blistered, peeling skin.   -Swelling of face, lips, mouth, tongue or throat.   -Tightness of chest, trouble breathing, swallowing or talking     Call your doctor:  - If IV / injection site gets red, warm, swollen, itchy or leaks fluid or pus.     (Leave dressing on your IV site for at least 2 hours and keep area clean and dry  - If you get sick or have symptoms of infection or are not feeling well for any reason.    (Wash your hands often, stay away from people who are sick)  - If you have side effects from your medication that do not go away or are bothersome.     (Refer to the teaching your nurse gave you for side effects to call your doctor about)    - Common side effects may include:  stuffy nose, headache, feeling tired, muscle aches, upset stomach  - Before receiving any vaccines     - Call the [facility] at   If:  - You get sick, are on antibiotics, have had a recent vaccine, have surgery or dental work and your doctor wants your visit rescheduled.  - You need to cancel and reschedule your visit for any reason. Call at least 2 days before your visit if you need to cancel.   - Your insurance changes before your next visit.    (We will need to get approval from your new insurance. This can take up to two weeks.)     The [facility] is opened Monday thru Friday. We are closed on weekends and holidays.   Voice mail will take your call if the center is closed. If you leave a message please allow 24 hours for a call back during weekdays. If you leave a message on a weekend/holiday, we will call you back the next business day.    A pharmacist is available Monday -  Friday from 8:30AM to 3:30PM to help answer any questions you may have about your prescriptions(s). Please call pharmacy at:    [facility]: [phone]  [facility]: [phone]  [facility]: [phone]              [facility]      Pain Infusion Aftercare Instructions      1. It is normal to feel sedated, tired and low in energy after a pain infusion. DO NOT DRIVE, OPERATE ANY MACHINERY, OR MAKE ANY IMPORTANT DECISIONS FOR AT LEAST 24 HOURS AFTER THE INFUSION.     2. Call the pain center at [phone] with any problems, questions, or concerns.     3. Eat light after the infusion. If you feel queasy or sick to your stomach, laying down with your eyes closed may help. When you resume eating start with something mild like clear liquids, yogurt, applesauce, crackers, etc… Gradually advance to a regular diet.     4. Do not leave your house alone the evening of your pain infusion.     5. No alcohol or sedative medications, such as sleeping pills, for 24 hours after your pain infusion.     6. Resume all other prescribed medications unless directed otherwise by you physician.     7. If you have any medical emergencies, call 911 or go directly to the closest emergency room.

## 2025-01-27 NOTE — TELEPHONE ENCOUNTER
Pt is having pain in arm and down back and into leg. Pt is requesting toradol.   [facility] in [place] .

## 2025-01-27 NOTE — PROGRESS NOTES
S: Patient here for 11th opioid sparing pain infusion. Patient reports 50% reduction in pain after last infusion that lasted 1 day.    Purpose of pain infusion meds explained along with potential side effects.  Patient verbalized understanding.    B: Pain Issues. Left shoulder, forehead, middle of back, calves    Is Patient breast feeding: no    A: Patient currently has pain described on flow sheet documentation. Designated  is [employer] [phone]. Patient last ate solid food 9 hours ago, and had liquid 1 hours ago.    R: Plan; Obtain IV access, do patient risk assessment, and start opioid sparing infusion as ordered. Monitoring for S/S of adverse reactions.    Post infusion teaching provided. Patient verbalized understanding. VSS, Patient states pain is 0/10. Will assist patient to waiting car via wheelchair.

## 2025-01-28 ENCOUNTER — OFFICE VISIT (OUTPATIENT)
Dept: PRIMARY CARE | Facility: CLINIC | Age: 55
End: 2025-01-28
Payer: COMMERCIAL

## 2025-01-28 VITALS
BODY MASS INDEX: 24.66 KG/M2 | WEIGHT: 148 LBS | HEART RATE: 72 BPM | DIASTOLIC BLOOD PRESSURE: 70 MMHG | HEIGHT: 65 IN | SYSTOLIC BLOOD PRESSURE: 120 MMHG

## 2025-01-28 DIAGNOSIS — N39.3 STRESS INCONTINENCE (FEMALE) (MALE): ICD-10-CM

## 2025-01-28 DIAGNOSIS — Z12.11 COLON CANCER SCREENING: ICD-10-CM

## 2025-01-28 DIAGNOSIS — M25.512 CHRONIC LEFT SHOULDER PAIN: Primary | ICD-10-CM

## 2025-01-28 DIAGNOSIS — G89.29 CHRONIC LEFT SHOULDER PAIN: Primary | ICD-10-CM

## 2025-01-28 DIAGNOSIS — Z12.31 ENCOUNTER FOR SCREENING MAMMOGRAM FOR MALIGNANT NEOPLASM OF BREAST: ICD-10-CM

## 2025-01-28 PROBLEM — B37.31 VAGINAL CANDIDIASIS: Status: RESOLVED | Noted: 2024-10-28 | Resolved: 2025-01-28

## 2025-01-28 PROCEDURE — 99214 OFFICE O/P EST MOD 30 MIN: CPT | Performed by: FAMILY MEDICINE

## 2025-01-28 PROCEDURE — 3008F BODY MASS INDEX DOCD: CPT | Performed by: FAMILY MEDICINE

## 2025-01-28 RX ORDER — KETOROLAC TROMETHAMINE 10 MG/1
10 TABLET, FILM COATED ORAL EVERY 8 HOURS PRN
Qty: 15 TABLET | Refills: 0 | Status: SHIPPED | OUTPATIENT
Start: 2025-01-28 | End: 2025-02-02

## 2025-01-28 RX ORDER — OXYBUTYNIN CHLORIDE 5 MG/1
5 TABLET ORAL 3 TIMES DAILY
COMMUNITY

## 2025-01-28 RX ORDER — OMEPRAZOLE 40 MG/1
40 CAPSULE, DELAYED RELEASE ORAL
Qty: 10 CAPSULE | Refills: 0 | Status: SHIPPED | OUTPATIENT
Start: 2025-01-28

## 2025-01-28 ASSESSMENT — PATIENT HEALTH QUESTIONNAIRE - PHQ9
2. FEELING DOWN, DEPRESSED OR HOPELESS: NOT AT ALL
1. LITTLE INTEREST OR PLEASURE IN DOING THINGS: NOT AT ALL
SUM OF ALL RESPONSES TO PHQ9 QUESTIONS 1 AND 2: 0

## 2025-01-28 NOTE — PROGRESS NOTES
"Subjective   Patient ID: Catherine Cano is a 54 y.o. female who presents for worse left shoulder pain and worse stress incontinence    HPI     Worse  pain in middle back radiating to BLE and 10/10 lt shoulder pain lately. No neck pain or worse ext weakness or numbness. Pt also c/o new stress urinary incontinence within the last week. She takes oxybutynin for urinary urgency.     Review of Systems    Objective   /70   Pulse 72   Ht 1.651 m (5' 5\")   Wt 67.1 kg (148 lb)   BMI 24.63 kg/m²     Physical Exam  No distress, well groomed, No sclera icterus. normal respiration, lungs: CTA b/l, heart: RRR,  normal pedal pulses, abd: soft, no tenderness, BS+, moderate low back and left shoulder tenderness,  normal strength and sensation at extremities, good balance    Assessment/Plan   Assessment & Plan  Chronic left shoulder pain  Worse  lately. Ketorolac as dir. cold compress, stretch exercise.   caution of SE from Ketorolac such as kidney damage and stomach bleeding. take Ketorolac with foods and omeprazole. Fu with pain specialist for her low back pain. Dc smoking    Orders:    ketorolac (Toradol) 10 mg tablet; Take 1 tablet (10 mg) by mouth every 8 hours if needed for moderate pain (4 - 6) for up to 5 days.    omeprazole (PriLOSEC) 40 mg DR capsule; Take 1 capsule (40 mg) by mouth once daily in the morning. Take before meals. Do not crush or chew.    Stress incontinence (female) (male)  Worse lately. recommend urology eval. Pt declined.  call if symptoms get worse        Encounter for screening mammogram for malignant neoplasm of breast    Orders:    BI mammo bilateral screening tomosynthesis; Future    Colon cancer screening    Orders:    Fecal Occult Blood Immunoassay; Future           "

## 2025-01-28 NOTE — ASSESSMENT & PLAN NOTE
Worse  lately. Ketorolac as dir. cold compress, stretch exercise.   caution of SE from Ketorolac such as kidney damage and stomach bleeding. take Ketorolac with foods and omeprazole. Fu with pain specialist for her low back pain. Dc smoking    Orders:    ketorolac (Toradol) 10 mg tablet; Take 1 tablet (10 mg) by mouth every 8 hours if needed for moderate pain (4 - 6) for up to 5 days.    omeprazole (PriLOSEC) 40 mg DR capsule; Take 1 capsule (40 mg) by mouth once daily in the morning. Take before meals. Do not crush or chew.

## 2025-02-06 DIAGNOSIS — M79.7 FIBROMYALGIA: Primary | ICD-10-CM

## 2025-02-06 RX ORDER — KETOROLAC TROMETHAMINE 30 MG/ML
30 INJECTION, SOLUTION INTRAMUSCULAR; INTRAVENOUS ONCE
OUTPATIENT
Start: 2025-02-11 | End: 2025-02-11

## 2025-02-06 RX ORDER — KETAMINE HCL IN NACL, ISO-OSM 100MG/10ML
SYRINGE (ML) INJECTION ONCE
OUTPATIENT
Start: 2025-02-11

## 2025-02-11 ENCOUNTER — APPOINTMENT (OUTPATIENT)
Dept: INFUSION THERAPY | Facility: CLINIC | Age: 55
End: 2025-02-11
Payer: COMMERCIAL

## 2025-02-12 ENCOUNTER — TELEPHONE (OUTPATIENT)
Dept: PRIMARY CARE | Facility: CLINIC | Age: 55
End: 2025-02-12
Payer: COMMERCIAL

## 2025-02-12 DIAGNOSIS — Z12.11 COLON CANCER SCREENING: Primary | ICD-10-CM

## 2025-02-12 DIAGNOSIS — F51.04 CHRONIC INSOMNIA: ICD-10-CM

## 2025-02-12 RX ORDER — TRAZODONE HYDROCHLORIDE 50 MG/1
50 TABLET ORAL NIGHTLY
Qty: 30 TABLET | Refills: 0 | Status: SHIPPED
Start: 2025-02-12 | End: 2025-02-21 | Stop reason: SDUPTHER

## 2025-02-12 NOTE — TELEPHONE ENCOUNTER
ketorolac (Toradol) 10 mg tablet// Take 1 tablet (10 mg) by mouth every 8 hours if needed for moderate pain (4 - 6) for up to 5 days.  [facility] in [address]   Pt stated that she is having pain down her back in to her buttocks.   She had to cancel her ketamine injection because she does not have a ride there.

## 2025-02-13 ENCOUNTER — APPOINTMENT (OUTPATIENT)
Dept: RADIOLOGY | Facility: HOSPITAL | Age: 55
End: 2025-02-13
Payer: COMMERCIAL

## 2025-02-13 ENCOUNTER — HOSPITAL ENCOUNTER (EMERGENCY)
Facility: HOSPITAL | Age: 55
Discharge: HOME | End: 2025-02-14
Attending: EMERGENCY MEDICINE
Payer: COMMERCIAL

## 2025-02-13 VITALS
WEIGHT: 148 LBS | TEMPERATURE: 97.6 F | RESPIRATION RATE: 16 BRPM | OXYGEN SATURATION: 98 % | BODY MASS INDEX: 25.27 KG/M2 | HEIGHT: 64 IN | HEART RATE: 85 BPM | DIASTOLIC BLOOD PRESSURE: 77 MMHG | SYSTOLIC BLOOD PRESSURE: 125 MMHG

## 2025-02-13 DIAGNOSIS — G89.29 CHRONIC BILATERAL BACK PAIN, UNSPECIFIED BACK LOCATION: ICD-10-CM

## 2025-02-13 DIAGNOSIS — M54.9 CHRONIC BILATERAL BACK PAIN, UNSPECIFIED BACK LOCATION: ICD-10-CM

## 2025-02-13 DIAGNOSIS — M54.9 ACUTE BILATERAL BACK PAIN, UNSPECIFIED BACK LOCATION: Primary | ICD-10-CM

## 2025-02-13 LAB
APPEARANCE UR: CLEAR
BILIRUB UR STRIP.AUTO-MCNC: NEGATIVE MG/DL
COLOR UR: COLORLESS
GLUCOSE UR STRIP.AUTO-MCNC: NORMAL MG/DL
KETONES UR STRIP.AUTO-MCNC: NEGATIVE MG/DL
LEUKOCYTE ESTERASE UR QL STRIP.AUTO: NEGATIVE
MUCOUS THREADS #/AREA URNS AUTO: NORMAL /LPF
NITRITE UR QL STRIP.AUTO: NEGATIVE
PH UR STRIP.AUTO: 5.5 [PH]
PROT UR STRIP.AUTO-MCNC: NEGATIVE MG/DL
RBC # UR STRIP.AUTO: ABNORMAL MG/DL
RBC #/AREA URNS AUTO: NORMAL /HPF
SP GR UR STRIP.AUTO: 1.01
SQUAMOUS #/AREA URNS AUTO: NORMAL /HPF
UROBILINOGEN UR STRIP.AUTO-MCNC: NORMAL MG/DL
WBC #/AREA URNS AUTO: NORMAL /HPF

## 2025-02-13 PROCEDURE — 72128 CT CHEST SPINE W/O DYE: CPT | Performed by: RADIOLOGY

## 2025-02-13 PROCEDURE — 70450 CT HEAD/BRAIN W/O DYE: CPT | Performed by: RADIOLOGY

## 2025-02-13 PROCEDURE — 72131 CT LUMBAR SPINE W/O DYE: CPT

## 2025-02-13 PROCEDURE — 2500000004 HC RX 250 GENERAL PHARMACY W/ HCPCS (ALT 636 FOR OP/ED): Performed by: NURSE PRACTITIONER

## 2025-02-13 PROCEDURE — 72131 CT LUMBAR SPINE W/O DYE: CPT | Performed by: RADIOLOGY

## 2025-02-13 PROCEDURE — 72128 CT CHEST SPINE W/O DYE: CPT

## 2025-02-13 PROCEDURE — 96372 THER/PROPH/DIAG INJ SC/IM: CPT | Performed by: NURSE PRACTITIONER

## 2025-02-13 PROCEDURE — 99284 EMERGENCY DEPT VISIT MOD MDM: CPT | Mod: 25 | Performed by: EMERGENCY MEDICINE

## 2025-02-13 PROCEDURE — 81001 URINALYSIS AUTO W/SCOPE: CPT | Performed by: NURSE PRACTITIONER

## 2025-02-13 PROCEDURE — 70450 CT HEAD/BRAIN W/O DYE: CPT

## 2025-02-13 RX ORDER — KETOROLAC TROMETHAMINE 30 MG/ML
30 INJECTION, SOLUTION INTRAMUSCULAR; INTRAVENOUS ONCE
Status: COMPLETED | OUTPATIENT
Start: 2025-02-13 | End: 2025-02-13

## 2025-02-13 RX ORDER — MORPHINE SULFATE 4 MG/ML
6 INJECTION INTRAVENOUS ONCE
Status: COMPLETED | OUTPATIENT
Start: 2025-02-13 | End: 2025-02-13

## 2025-02-13 RX ORDER — METHOCARBAMOL 100 MG/ML
1000 INJECTION, SOLUTION INTRAMUSCULAR; INTRAVENOUS ONCE
Status: COMPLETED | OUTPATIENT
Start: 2025-02-13 | End: 2025-02-13

## 2025-02-13 RX ADMIN — KETOROLAC TROMETHAMINE 30 MG: 30 INJECTION, SOLUTION INTRAMUSCULAR at 20:07

## 2025-02-13 RX ADMIN — MORPHINE SULFATE 6 MG: 4 INJECTION, SOLUTION INTRAMUSCULAR; INTRAVENOUS at 20:07

## 2025-02-13 RX ADMIN — METHOCARBAMOL 1000 MG: 100 INJECTION INTRAMUSCULAR; INTRAVENOUS at 20:07

## 2025-02-13 ASSESSMENT — LIFESTYLE VARIABLES
EVER FELT BAD OR GUILTY ABOUT YOUR DRINKING: NO
EVER HAD A DRINK FIRST THING IN THE MORNING TO STEADY YOUR NERVES TO GET RID OF A HANGOVER: NO
HAVE YOU EVER FELT YOU SHOULD CUT DOWN ON YOUR DRINKING: NO
TOTAL SCORE: 0
HAVE PEOPLE ANNOYED YOU BY CRITICIZING YOUR DRINKING: NO

## 2025-02-13 ASSESSMENT — PAIN DESCRIPTION - DESCRIPTORS: DESCRIPTORS: ACHING;SHARP;SHOOTING;SORE;SPASM

## 2025-02-13 ASSESSMENT — PAIN DESCRIPTION - LOCATION: LOCATION: BACK

## 2025-02-13 ASSESSMENT — PAIN - FUNCTIONAL ASSESSMENT: PAIN_FUNCTIONAL_ASSESSMENT: 0-10

## 2025-02-13 ASSESSMENT — PAIN SCALES - GENERAL: PAINLEVEL_OUTOF10: 10 - WORST POSSIBLE PAIN

## 2025-02-13 ASSESSMENT — PAIN DESCRIPTION - ORIENTATION: ORIENTATION: RIGHT;LOWER

## 2025-02-13 ASSESSMENT — PAIN DESCRIPTION - PAIN TYPE: TYPE: ACUTE PAIN;CHRONIC PAIN

## 2025-02-13 NOTE — PROGRESS NOTES
I attempted to call Catherine Cano to discuss her plan of care, however she was walking into [facility] emergency room.  She is stating that her T12 is broke and she was barely standing.  As we previously discussed over virtual visit her CT scan of her cervical thoracic and lumbar spine.  Cervical CAT scan showed minimal cervical spondylosis without acute fracture or subluxation.  Thoracic and lumbar CT scan showed No acute thoracic or lumbar spine abnormality. Postoperative and degenerative changes as described.  Also explained patient has very high risk for chronic opioid medication.  I am glad you are going to the emergency room I hope you get some symptom relief.  Thank you,  Ashu Mukherjee CNP

## 2025-02-13 NOTE — ED TRIAGE NOTES
"Pt having acute on chronic back pain after picking up grandalison Sunday.... pt states pain is progressively worsening and unrelieved by rest, position changes, and pain medications. States urine \"leaks some\" since injury. Frequent stools but no incontinence. Pt agitated, anxious, tearful throughout triage   "

## 2025-02-14 PROCEDURE — 2500000004 HC RX 250 GENERAL PHARMACY W/ HCPCS (ALT 636 FOR OP/ED): Performed by: EMERGENCY MEDICINE

## 2025-02-14 PROCEDURE — 96374 THER/PROPH/DIAG INJ IV PUSH: CPT

## 2025-02-14 RX ORDER — OXYCODONE AND ACETAMINOPHEN 5; 325 MG/1; MG/1
1 TABLET ORAL EVERY 6 HOURS PRN
Qty: 8 TABLET | Refills: 0 | Status: SHIPPED | OUTPATIENT
Start: 2025-02-14 | End: 2025-02-17

## 2025-02-14 RX ORDER — MORPHINE SULFATE 4 MG/ML
4 INJECTION INTRAVENOUS ONCE
Status: COMPLETED | OUTPATIENT
Start: 2025-02-14 | End: 2025-02-14

## 2025-02-14 RX ADMIN — MORPHINE SULFATE 4 MG: 4 INJECTION, SOLUTION INTRAMUSCULAR; INTRAVENOUS at 00:22

## 2025-02-14 NOTE — DISCHARGE INSTRUCTIONS
As we talked about, pain medications for chronic pain need to come from a doctor that can follow with you as an outpatient (your primary care doctor or your pain management doctor). I am writing you a prescription for a couple of days of pain medications because of the acute worsening of your pain, however you should discuss this with your pain management doctor as filling pain medications outside of any agreements with them may create problems.
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
## 2025-02-19 ENCOUNTER — TELEPHONE (OUTPATIENT)
Dept: PAIN MEDICINE | Facility: CLINIC | Age: 55
End: 2025-02-19
Payer: COMMERCIAL

## 2025-02-19 DIAGNOSIS — M54.9 ACUTE BILATERAL BACK PAIN, UNSPECIFIED BACK LOCATION: ICD-10-CM

## 2025-02-20 DIAGNOSIS — M54.50 CHRONIC BILATERAL LOW BACK PAIN WITHOUT SCIATICA: ICD-10-CM

## 2025-02-20 DIAGNOSIS — G89.29 CHRONIC BILATERAL LOW BACK PAIN WITHOUT SCIATICA: ICD-10-CM

## 2025-02-20 DIAGNOSIS — F51.04 CHRONIC INSOMNIA: ICD-10-CM

## 2025-02-20 DIAGNOSIS — F51.04 CHRONIC INSOMNIA: Primary | ICD-10-CM

## 2025-02-20 DIAGNOSIS — E78.2 COMBINED HYPERLIPIDEMIA: ICD-10-CM

## 2025-02-20 DIAGNOSIS — E03.9 ACQUIRED HYPOTHYROIDISM: ICD-10-CM

## 2025-02-20 RX ORDER — TRAZODONE HYDROCHLORIDE 50 MG/1
50 TABLET ORAL NIGHTLY
Qty: 30 TABLET | Refills: 0 | OUTPATIENT
Start: 2025-02-20

## 2025-02-21 ENCOUNTER — HOSPITAL ENCOUNTER (OUTPATIENT)
Dept: RADIOLOGY | Facility: HOSPITAL | Age: 55
Discharge: HOME | End: 2025-02-21
Payer: COMMERCIAL

## 2025-02-21 ENCOUNTER — TELEPHONE (OUTPATIENT)
Dept: PRIMARY CARE | Facility: CLINIC | Age: 55
End: 2025-02-21
Payer: COMMERCIAL

## 2025-02-21 VITALS — WEIGHT: 148 LBS | BODY MASS INDEX: 25.27 KG/M2 | HEIGHT: 64 IN

## 2025-02-21 DIAGNOSIS — Z12.31 ENCOUNTER FOR SCREENING MAMMOGRAM FOR MALIGNANT NEOPLASM OF BREAST: ICD-10-CM

## 2025-02-21 PROCEDURE — 77063 BREAST TOMOSYNTHESIS BI: CPT | Performed by: RADIOLOGY

## 2025-02-21 PROCEDURE — 77067 SCR MAMMO BI INCL CAD: CPT | Performed by: RADIOLOGY

## 2025-02-21 PROCEDURE — 77067 SCR MAMMO BI INCL CAD: CPT

## 2025-02-21 RX ORDER — CYCLOBENZAPRINE HCL 10 MG
10 TABLET ORAL DAILY
Qty: 90 TABLET | Refills: 0 | Status: SHIPPED | OUTPATIENT
Start: 2025-02-21

## 2025-02-21 RX ORDER — ATORVASTATIN CALCIUM 40 MG/1
40 TABLET, FILM COATED ORAL NIGHTLY
Qty: 90 TABLET | Refills: 3 | Status: SHIPPED | OUTPATIENT
Start: 2025-02-21

## 2025-02-21 RX ORDER — TRAZODONE HYDROCHLORIDE 50 MG/1
50 TABLET ORAL NIGHTLY
Qty: 90 TABLET | Refills: 0 | Status: SHIPPED | OUTPATIENT
Start: 2025-02-21

## 2025-02-21 RX ORDER — LEVOTHYROXINE SODIUM 112 UG/1
112 TABLET ORAL
Qty: 90 TABLET | Refills: 3 | Status: SHIPPED | OUTPATIENT
Start: 2025-02-21

## 2025-02-21 NOTE — TELEPHONE ENCOUNTER
Pt stated that she would like the following RX's to go to [facility] for a 90 day supply     traZODone (Desyrel) 50 mg tablet// Take 1 tablet (50 mg) by mouth once daily at bedtime.    cyclobenzaprine (Flexeril) 10 mg tablet//AZUL ONE TABLET BY MOUTH EVERY DAY    atorvastatin (Lipitor) 40 mg tablet//Take 1 tablet (40 mg) by mouth once daily at bedtime.    levothyroxine (Synthroid, Levoxyl) 112 mcg tablet // Take 1 tablet (112 mcg) by mouth once daily in the morning. Take before meals.  [facility]

## 2025-02-24 DIAGNOSIS — M79.7 FIBROMYALGIA: Primary | ICD-10-CM

## 2025-02-24 RX ORDER — KETOROLAC TROMETHAMINE 30 MG/ML
30 INJECTION, SOLUTION INTRAMUSCULAR; INTRAVENOUS ONCE
Status: CANCELLED | OUTPATIENT
Start: 2025-02-26 | End: 2025-02-26

## 2025-02-24 RX ORDER — KETAMINE HCL IN NACL, ISO-OSM 100MG/10ML
SYRINGE (ML) INJECTION ONCE
Status: CANCELLED | OUTPATIENT
Start: 2025-02-26

## 2025-03-01 LAB — NONINV COLON CA DNA+OCC BLD SCRN STL QL: NEGATIVE

## 2025-04-22 ENCOUNTER — TELEMEDICINE (OUTPATIENT)
Dept: PRIMARY CARE | Facility: CLINIC | Age: 55
End: 2025-04-22
Payer: COMMERCIAL

## 2025-04-22 DIAGNOSIS — M54.50 ACUTE ON CHRONIC LOW BACK PAIN: Primary | ICD-10-CM

## 2025-04-22 DIAGNOSIS — G89.29 ACUTE ON CHRONIC LOW BACK PAIN: Primary | ICD-10-CM

## 2025-04-22 PROCEDURE — 99213 OFFICE O/P EST LOW 20 MIN: CPT | Performed by: FAMILY MEDICINE

## 2025-04-22 RX ORDER — KETOROLAC TROMETHAMINE 10 MG/1
10 TABLET, FILM COATED ORAL 3 TIMES DAILY
Qty: 15 TABLET | Refills: 0 | Status: SHIPPED | OUTPATIENT
Start: 2025-04-22 | End: 2025-04-27

## 2025-04-22 NOTE — PROGRESS NOTES
Subjective   Patient ID: Catherine Cano is a 54 y.o. female who presents for worse low back when pt tried to pull out a ham from oven at home. It was a dull pain was at 9/10. the pain was localized to low back. pt denies LE weakness or numbness. pt controlled BM and bladder well. pt stated that walking or bending  over increased the pain. no imbalance or falls.       HPI     Review of Systems    Objective   There were no vitals taken for this visit.    Physical Exam    Assessment/Plan   Assessment & Plan  Acute on chronic low back pain  worse lately, pt declined lumbar xr. Cont flexeril.  cold compress, stretch exercise.  start toradol as dir,   caution of SE from toradol such as kidney damage and stomach bleeding. take toradol with foods. call office if symptoms do not improve in 2 weeks or if pain gets worse. Fu with pain specialist    Orders:    ketorolac (Toradol) 10 mg tablet; Take 1 tablet (10 mg) by mouth 3 times a day for 5 days.

## 2025-04-29 ENCOUNTER — TELEPHONE (OUTPATIENT)
Dept: INFUSION THERAPY | Facility: CLINIC | Age: 55
End: 2025-04-29
Payer: COMMERCIAL

## 2025-04-29 DIAGNOSIS — F51.04 CHRONIC INSOMNIA: ICD-10-CM

## 2025-04-29 RX ORDER — TRAZODONE HYDROCHLORIDE 50 MG/1
50 TABLET ORAL NIGHTLY
Qty: 90 TABLET | Refills: 3 | OUTPATIENT
Start: 2025-04-29

## 2025-05-02 ENCOUNTER — TELEPHONE (OUTPATIENT)
Dept: PAIN MEDICINE | Facility: CLINIC | Age: 55
End: 2025-05-02
Payer: COMMERCIAL

## 2025-05-02 NOTE — TELEPHONE ENCOUNTER
traZODone (Desyrel) 50 mg tablet// Take 1 tablet (50 mg) by mouth once daily at bedtime.  cyclobenzaprine (Flexeril) 10 mg tablet//Take 1 tablet (10 mg) by mouth once daily.  [facility]     oxybutynin (Ditropan) 5 mg//tablet//Take 1 tablet (5 mg) by mouth 3 times a day.       venlafaxine XR (Effexor-XR) 150 mg 24 hr capsule// 1 cap po qd  [facility] [address] [place] [address] -

## 2025-05-05 ENCOUNTER — APPOINTMENT (OUTPATIENT)
Dept: PAIN MEDICINE | Facility: CLINIC | Age: 55
End: 2025-05-05
Payer: COMMERCIAL

## 2025-05-06 ENCOUNTER — APPOINTMENT (OUTPATIENT)
Dept: PRIMARY CARE | Facility: CLINIC | Age: 55
End: 2025-05-06
Payer: COMMERCIAL

## 2025-05-07 ENCOUNTER — TELEMEDICINE (OUTPATIENT)
Dept: PRIMARY CARE | Facility: CLINIC | Age: 55
End: 2025-05-07
Payer: COMMERCIAL

## 2025-05-07 DIAGNOSIS — L29.3 GENITAL PRURITUS: ICD-10-CM

## 2025-05-07 DIAGNOSIS — B00.1 HERPES LABIALIS: Primary | ICD-10-CM

## 2025-05-07 DIAGNOSIS — F32.A DEPRESSION, UNSPECIFIED DEPRESSION TYPE: ICD-10-CM

## 2025-05-07 DIAGNOSIS — F51.04 CHRONIC INSOMNIA: ICD-10-CM

## 2025-05-07 DIAGNOSIS — B37.31 VAGINAL YEAST INFECTION: ICD-10-CM

## 2025-05-07 PROCEDURE — 99214 OFFICE O/P EST MOD 30 MIN: CPT | Performed by: FAMILY MEDICINE

## 2025-05-07 RX ORDER — VALACYCLOVIR HYDROCHLORIDE 1 G/1
2000 TABLET, FILM COATED ORAL 2 TIMES DAILY
Qty: 4 TABLET | Refills: 0 | Status: SHIPPED | OUTPATIENT
Start: 2025-05-07 | End: 2025-05-07 | Stop reason: SDUPTHER

## 2025-05-07 RX ORDER — FLUCONAZOLE 150 MG/1
150 TABLET ORAL ONCE
Qty: 1 TABLET | Refills: 0 | Status: SHIPPED | OUTPATIENT
Start: 2025-05-07 | End: 2025-05-07

## 2025-05-07 RX ORDER — TRAZODONE HYDROCHLORIDE 50 MG/1
50 TABLET ORAL NIGHTLY
Qty: 90 TABLET | Refills: 0 | Status: SHIPPED | OUTPATIENT
Start: 2025-05-07

## 2025-05-07 RX ORDER — VALACYCLOVIR HYDROCHLORIDE 1 G/1
2000 TABLET, FILM COATED ORAL 2 TIMES DAILY
Qty: 4 TABLET | Refills: 0 | Status: SHIPPED | OUTPATIENT
Start: 2025-05-07 | End: 2025-05-08

## 2025-05-07 RX ORDER — VENLAFAXINE HYDROCHLORIDE 150 MG/1
CAPSULE, EXTENDED RELEASE ORAL
Qty: 90 CAPSULE | Refills: 3 | Status: SHIPPED | OUTPATIENT
Start: 2025-05-07

## 2025-05-07 NOTE — ASSESSMENT & PLAN NOTE
Depression, controlled with effexor. No HI/SI. Cont. the same. f/u in 3 mos    Orders:    venlafaxine XR (Effexor-XR) 150 mg 24 hr capsule; 1 cap po qd

## 2025-05-07 NOTE — PROGRESS NOTES
Subjective   Patient ID: Catherine Cano is a 54 y.o. female who presents for fu    HPI    pt stated trazodone her controlled trouble falling and staying asleep. No drowsiness, constipation, fatigue.  Pt noticed cold sores 2 days ago with burning sensation at b/l mouth corners.   No fever or chills. Pt has had intermittent genital area itchiness in the past few mos. Pt started to have vaginal discharge  in the past few days. Diflucan po resolve her similar vaginal discharge before.     Review of Systems    Objective   There were no vitals taken for this visit.    Physical Exam    Assessment/Plan   Assessment & Plan  Herpes labialis  Call office if symptoms do not improve in 7 days    Orders:    valACYclovir (Valtrex) 1 gram tablet; Take 2 tablets (2,000 mg) by mouth 2 times a day for 1 day.    Genital pruritus    Orders:    Referral to Gynecology; Future    Vaginal yeast infection  Call office if symptoms do not improve in a few days    Orders:    fluconazole (Diflucan) 150 mg tablet; Take 1 tablet (150 mg) by mouth 1 time for 1 dose.    Chronic insomnia  Insomnia, controlled with trazodone. Recommend psychology counseling.  Recommend good sleep hygiene. Caution side effects of trazodone such as feeling sleepy or tired, headaches, nausea, constipation, dry mouth etc. Pt declined sleep specialist evaluation. Will monitor.    Orders:    traZODone (Desyrel) 50 mg tablet; Take 1 tablet (50 mg) by mouth once daily at bedtime.    Depression, unspecified depression type  Depression, controlled with effexor. No HI/SI. Cont. the same. f/u in 3 mos    Orders:    venlafaxine XR (Effexor-XR) 150 mg 24 hr capsule; 1 cap po qd

## 2025-05-13 ENCOUNTER — TELEPHONE (OUTPATIENT)
Dept: PRIMARY CARE | Facility: CLINIC | Age: 55
End: 2025-05-13
Payer: COMMERCIAL

## 2025-05-16 ENCOUNTER — TELEMEDICINE (OUTPATIENT)
Dept: PRIMARY CARE | Facility: CLINIC | Age: 55
End: 2025-05-16
Payer: COMMERCIAL

## 2025-05-16 DIAGNOSIS — H10.32 ACUTE BACTERIAL CONJUNCTIVITIS OF LEFT EYE: Primary | ICD-10-CM

## 2025-05-16 PROCEDURE — 99213 OFFICE O/P EST LOW 20 MIN: CPT | Performed by: FAMILY MEDICINE

## 2025-05-16 RX ORDER — ERYTHROMYCIN 5 MG/G
OINTMENT OPHTHALMIC EVERY 6 HOURS
Qty: 3.5 G | Refills: 0 | Status: SHIPPED | OUTPATIENT
Start: 2025-05-16

## 2025-05-16 NOTE — PROGRESS NOTES
Subjective   Patient ID: Cathreine Cano is a 54 y.o. female who presents for pink eye    HPI   Patient developed Lt pink eye yesterday with yellow gluey discharge from the eye in am. No blurry vision, diplopia or pain in the eye. No sinus congestion, cough, runny nose. No fever or chills.    Review of Systems    Objective   There were no vitals taken for this visit.    Physical Exam    Assessment/Plan   Assessment & Plan  Acute bacterial conjunctivitis of left eye  Acute conjunctivitis, abx eye oint as above. Advise to wash hands frequently. Call office if symptoms do not resolve in 3-4 days.     Orders:    erythromycin (Romycin) 5 mg/gram (0.5 %) ophthalmic ointment; Apply to left eye every 6 hours. Apply Amount per Dose: 0.5 inch (~1 cm) per dose.

## 2025-05-17 DIAGNOSIS — G89.29 CHRONIC BILATERAL LOW BACK PAIN WITHOUT SCIATICA: ICD-10-CM

## 2025-05-17 DIAGNOSIS — M54.50 CHRONIC BILATERAL LOW BACK PAIN WITHOUT SCIATICA: ICD-10-CM

## 2025-05-17 RX ORDER — CYCLOBENZAPRINE HCL 10 MG
10 TABLET ORAL DAILY
Qty: 90 TABLET | Refills: 0 | Status: SHIPPED | OUTPATIENT
Start: 2025-05-17

## 2025-07-09 DIAGNOSIS — F51.04 CHRONIC INSOMNIA: ICD-10-CM

## 2025-07-09 RX ORDER — TRAZODONE HYDROCHLORIDE 50 MG/1
50 TABLET ORAL NIGHTLY
Qty: 90 TABLET | Refills: 3 | Status: SHIPPED | OUTPATIENT
Start: 2025-07-09

## 2025-07-14 ENCOUNTER — TELEPHONE (OUTPATIENT)
Dept: PRIMARY CARE | Facility: CLINIC | Age: 55
End: 2025-07-14
Payer: COMMERCIAL

## 2025-07-14 DIAGNOSIS — B00.1 RECURRENT COLD SORES: Primary | ICD-10-CM

## 2025-07-14 RX ORDER — VALACYCLOVIR HYDROCHLORIDE 1 G/1
2000 TABLET, FILM COATED ORAL 2 TIMES DAILY
Qty: 4 TABLET | Refills: 0 | Status: SHIPPED | OUTPATIENT
Start: 2025-07-14 | End: 2025-07-15

## 2025-07-14 NOTE — TELEPHONE ENCOUNTER
Pt stated she is getting cold sores in nose requesting refill   valACYclovir (Valtrex) 1 gram tablet//Take 2 tablets (2,000 mg) by mouth 2 times a day for 1 day.  And   ketorolac (Toradol) 10 mg tablet// Take 1 tablet (10 mg) by mouth every 8 hours if needed for moderate pain (4 - 6) for up to 5 days.     [facility] [address] [place] [address]

## 2025-07-15 ENCOUNTER — TELEMEDICINE (OUTPATIENT)
Dept: PRIMARY CARE | Facility: CLINIC | Age: 55
End: 2025-07-15
Payer: COMMERCIAL

## 2025-07-15 DIAGNOSIS — M25.571 ACUTE RIGHT ANKLE PAIN: Primary | ICD-10-CM

## 2025-07-15 DIAGNOSIS — R39.15 URINARY URGENCY: ICD-10-CM

## 2025-07-15 PROCEDURE — 99214 OFFICE O/P EST MOD 30 MIN: CPT | Performed by: FAMILY MEDICINE

## 2025-07-15 RX ORDER — KETOROLAC TROMETHAMINE 10 MG/1
10 TABLET, FILM COATED ORAL EVERY 8 HOURS PRN
Qty: 15 TABLET | Refills: 0 | Status: SHIPPED | OUTPATIENT
Start: 2025-07-15 | End: 2025-07-20

## 2025-07-15 RX ORDER — OXYBUTYNIN CHLORIDE 5 MG/1
5 TABLET ORAL 3 TIMES DAILY
Qty: 90 TABLET | Refills: 0 | Status: SHIPPED | OUTPATIENT
Start: 2025-07-15

## 2025-07-15 NOTE — PROGRESS NOTES
Subjective   Patient ID: Catherine Cano is a 54 y.o. female who presents rt twisted ankle 4 days    HPI   Pt accidentally twisted her lateral rt ankle 4 days ago causing swelling, bruise and pain at 8/10. Pt was able to bear wt on rt foot. No local numbness. The pain persisted today. urinary frequency and urgency was controlled with oxybutynin.  no hematuria, flank pain, nausea, vomiting. No cp. Normal appetite.   Review of Systems    Objective   There were no vitals taken for this visit.    Physical Exam    Assessment/Plan   Assessment & Plan  Acute right ankle pain  cold compress, stretch exercise.  start toradol tab as dir. caution of SE from toradol such as kidney damage and stomach bleeding. take mobic with foods. call office if symptoms do not improve in 2 weeks or if pain gets worse.  Dc smokinng  Orders:    ketorolac (Toradol) 10 mg tablet; Take 1 tablet (10 mg) by mouth every 8 hours if needed for moderate pain (4 - 6) for up to 5 days.    Urinary urgency  Controlled. Cont oxybutynin.   Orders:    oxyBUTYnin (Ditropan) 5 mg tablet; Take 1 tablet (5 mg) by mouth 3 times a day.

## 2025-07-15 NOTE — ASSESSMENT & PLAN NOTE
Controlled. Cont oxybutynin.   Orders:    oxyBUTYnin (Ditropan) 5 mg tablet; Take 1 tablet (5 mg) by mouth 3 times a day.

## 2025-07-16 DIAGNOSIS — G89.29 CHRONIC BILATERAL LOW BACK PAIN WITHOUT SCIATICA: ICD-10-CM

## 2025-07-16 DIAGNOSIS — M54.50 CHRONIC BILATERAL LOW BACK PAIN WITHOUT SCIATICA: ICD-10-CM

## 2025-07-16 RX ORDER — CYCLOBENZAPRINE HCL 10 MG
10 TABLET ORAL DAILY
Qty: 90 TABLET | Refills: 0 | Status: SHIPPED | OUTPATIENT
Start: 2025-07-16

## 2025-08-25 ENCOUNTER — TELEMEDICINE (OUTPATIENT)
Dept: PRIMARY CARE | Facility: CLINIC | Age: 55
End: 2025-08-25
Payer: COMMERCIAL

## 2025-08-25 DIAGNOSIS — M25.552 PAIN OF BOTH HIP JOINTS: Primary | ICD-10-CM

## 2025-08-25 DIAGNOSIS — M25.551 PAIN OF BOTH HIP JOINTS: Primary | ICD-10-CM

## 2025-08-25 DIAGNOSIS — M79.7 FIBROMYALGIA: ICD-10-CM

## 2025-08-25 PROCEDURE — 99214 OFFICE O/P EST MOD 30 MIN: CPT | Performed by: FAMILY MEDICINE

## 2025-08-25 RX ORDER — KETOROLAC TROMETHAMINE 10 MG/1
10 TABLET, FILM COATED ORAL EVERY 8 HOURS PRN
Qty: 15 TABLET | Refills: 0 | Status: SHIPPED | OUTPATIENT
Start: 2025-08-25 | End: 2025-08-30

## 2025-08-25 RX ORDER — KETOROLAC TROMETHAMINE 10 MG/1
10 TABLET, FILM COATED ORAL EVERY 8 HOURS PRN
Qty: 15 TABLET | Refills: 0 | Status: SHIPPED | OUTPATIENT
Start: 2025-08-25 | End: 2025-08-25 | Stop reason: SDUPTHER